# Patient Record
Sex: FEMALE | Race: BLACK OR AFRICAN AMERICAN | NOT HISPANIC OR LATINO | Employment: OTHER | URBAN - METROPOLITAN AREA
[De-identification: names, ages, dates, MRNs, and addresses within clinical notes are randomized per-mention and may not be internally consistent; named-entity substitution may affect disease eponyms.]

---

## 2017-10-10 ENCOUNTER — HISTORICAL (OUTPATIENT)
Dept: INTERNAL MEDICINE | Facility: CLINIC | Age: 72
End: 2017-10-10

## 2018-08-23 ENCOUNTER — HISTORICAL (OUTPATIENT)
Dept: RADIOLOGY | Facility: HOSPITAL | Age: 73
End: 2018-08-23

## 2018-08-31 ENCOUNTER — HISTORICAL (OUTPATIENT)
Dept: INTERNAL MEDICINE | Facility: CLINIC | Age: 73
End: 2018-08-31

## 2018-10-19 ENCOUNTER — HISTORICAL (OUTPATIENT)
Dept: INTERNAL MEDICINE | Facility: CLINIC | Age: 73
End: 2018-10-19

## 2019-02-01 ENCOUNTER — HISTORICAL (OUTPATIENT)
Dept: INTERNAL MEDICINE | Facility: CLINIC | Age: 74
End: 2019-02-01

## 2019-07-05 ENCOUNTER — HISTORICAL (OUTPATIENT)
Dept: INTERNAL MEDICINE | Facility: CLINIC | Age: 74
End: 2019-07-05

## 2019-11-14 ENCOUNTER — HISTORICAL (OUTPATIENT)
Dept: RADIOLOGY | Facility: HOSPITAL | Age: 74
End: 2019-11-14

## 2020-08-06 ENCOUNTER — HISTORICAL (OUTPATIENT)
Dept: INTERNAL MEDICINE | Facility: CLINIC | Age: 75
End: 2020-08-06

## 2020-08-06 ENCOUNTER — HISTORICAL (OUTPATIENT)
Dept: ADMINISTRATIVE | Facility: HOSPITAL | Age: 75
End: 2020-08-06

## 2021-06-24 ENCOUNTER — HISTORICAL (OUTPATIENT)
Dept: INTERNAL MEDICINE | Facility: CLINIC | Age: 76
End: 2021-06-24

## 2021-06-26 LAB — FINAL CULTURE: NORMAL

## 2021-07-12 ENCOUNTER — HISTORICAL (OUTPATIENT)
Dept: RADIOLOGY | Facility: HOSPITAL | Age: 76
End: 2021-07-12

## 2021-08-24 ENCOUNTER — HISTORICAL (OUTPATIENT)
Dept: INTERNAL MEDICINE | Facility: CLINIC | Age: 76
End: 2021-08-24

## 2021-10-19 ENCOUNTER — HISTORICAL (OUTPATIENT)
Dept: ADMINISTRATIVE | Facility: HOSPITAL | Age: 76
End: 2021-10-19

## 2021-10-21 LAB — FINAL CULTURE: NO GROWTH

## 2021-10-28 ENCOUNTER — HISTORICAL (OUTPATIENT)
Dept: RADIOLOGY | Facility: HOSPITAL | Age: 76
End: 2021-10-28

## 2021-11-15 ENCOUNTER — HISTORICAL (OUTPATIENT)
Dept: CARDIOLOGY | Facility: HOSPITAL | Age: 76
End: 2021-11-15

## 2022-03-18 ENCOUNTER — HISTORICAL (OUTPATIENT)
Dept: INTERNAL MEDICINE | Facility: CLINIC | Age: 77
End: 2022-03-18

## 2022-03-18 LAB
ABS NEUT (OLG): 5.14 (ref 2.1–9.2)
ALBUMIN SERPL-MCNC: 3.6 G/DL (ref 3.4–4.8)
ALBUMIN/GLOB SERPL: 1.1 {RATIO} (ref 1.1–2)
ALP SERPL-CCNC: 31 U/L (ref 40–150)
ALT SERPL-CCNC: 12 U/L (ref 0–55)
AST SERPL-CCNC: 13 U/L (ref 5–34)
BASOPHILS # BLD AUTO: 0 10*3/UL (ref 0–0.2)
BASOPHILS NFR BLD AUTO: 0 %
BILIRUB SERPL-MCNC: 1 MG/DL
BILIRUBIN DIRECT+TOT PNL SERPL-MCNC: 0.4 (ref 0–0.5)
BILIRUBIN DIRECT+TOT PNL SERPL-MCNC: 0.6 (ref 0–0.8)
BUN SERPL-MCNC: 12.5 MG/DL (ref 9.8–20.1)
CALCIUM SERPL-MCNC: 9 MG/DL (ref 8.7–10.5)
CHLORIDE SERPL-SCNC: 107 MMOL/L (ref 98–107)
CHOLEST SERPL-MCNC: 120 MG/DL
CHOLEST/HDLC SERPL: 2 {RATIO} (ref 0–5)
CO2 SERPL-SCNC: 25 MMOL/L (ref 23–31)
CREAT SERPL-MCNC: 0.81 MG/DL (ref 0.55–1.02)
DEPRECATED CALCIDIOL+CALCIFEROL SERPL-MC: 39.9 NG/ML (ref 30–80)
EOSINOPHIL # BLD AUTO: 0 10*3/UL (ref 0–0.9)
EOSINOPHIL NFR BLD AUTO: 0 %
ERYTHROCYTE [DISTWIDTH] IN BLOOD BY AUTOMATED COUNT: 14 % (ref 11.5–14.5)
FLAG2 (OHS): 60
FLAG3 (OHS): 80
FLAGS (OHS): 80
GLOBULIN SER-MCNC: 3.3 G/DL (ref 2.4–3.5)
GLUCOSE SERPL-MCNC: 108 MG/DL (ref 82–115)
HCT VFR BLD AUTO: 36.3 % (ref 35–46)
HDLC SERPL-MCNC: 48 MG/DL (ref 35–60)
HEMOLYSIS INTERF INDEX SERPL-ACNC: 1
HGB BLD-MCNC: 11.3 G/DL (ref 12–16)
ICTERIC INTERF INDEX SERPL-ACNC: 1
IMM GRANULOCYTES # BLD AUTO: 0.27 10*3/UL
IMM GRANULOCYTES NFR BLD AUTO: 3 %
IMM. NE 1 SUSPECT FLAG (OHS): 20
IMM. NE 2 SUSPECT FLAG (OHS): 30
LDLC SERPL CALC-MCNC: 58 MG/DL (ref 50–140)
LIPEMIC INTERF INDEX SERPL-ACNC: 3
LOW EVENT # SUSPECT FLAG (OHS): 80
LYMPHOCYTES # BLD AUTO: 2.1 10*3/UL (ref 0.6–4.6)
LYMPHOCYTES NFR BLD AUTO: 25 %
MANUAL DIFF? (OHS): NO
MCH RBC QN AUTO: 27.8 PG (ref 26–34)
MCHC RBC AUTO-ENTMCNC: 31.1 G/DL (ref 31–37)
MCV RBC AUTO: 89.2 FL (ref 80–100)
MO BLASTS SUSPECT FLAG (OHS): 40
MONOCYTES # BLD AUTO: 1 10*3/UL (ref 0.1–1.3)
MONOCYTES NFR BLD AUTO: 11 %
NEUTROPHILS # BLD AUTO: 5.14 10*3/UL (ref 2.1–9.2)
NEUTROPHILS NFR BLD AUTO: 60 %
NRBC BLD AUTO-RTO: 0 % (ref 0–0.2)
PLATELET # BLD AUTO: 357 10*3/UL (ref 130–400)
PLATELET CLUMPS SUSPECT FLAG (OHS): 10
PMV BLD AUTO: 8.6 FL (ref 7.4–10.4)
POTASSIUM SERPL-SCNC: 3.9 MMOL/L (ref 3.5–5.1)
PROT SERPL-MCNC: 6.9 G/DL (ref 5.8–7.6)
RBC # BLD AUTO: 4.07 10*6/UL (ref 4–5.2)
SODIUM SERPL-SCNC: 139 MMOL/L (ref 136–145)
T4 SERPL-MCNC: 7.84 UG/DL (ref 4.87–11.72)
TRIGL SERPL-MCNC: 69 MG/DL (ref 37–140)
TSH SERPL-ACNC: 2.22 M[IU]/L (ref 0.35–4.94)
VLDLC SERPL CALC-MCNC: 14 MG/DL
WBC # SPEC AUTO: 8.6 10*3/UL (ref 4.5–11)

## 2022-04-07 ENCOUNTER — HISTORICAL (OUTPATIENT)
Dept: ADMINISTRATIVE | Facility: HOSPITAL | Age: 77
End: 2022-04-07
Payer: MEDICAID

## 2022-04-24 VITALS
HEIGHT: 65 IN | BODY MASS INDEX: 17.7 KG/M2 | DIASTOLIC BLOOD PRESSURE: 63 MMHG | SYSTOLIC BLOOD PRESSURE: 120 MMHG | WEIGHT: 106.25 LBS

## 2022-05-02 NOTE — HISTORICAL [facility]ERNER
This is a historical note converted from Cerjim. Formatting and pictures may have been removed.  Please reference Cerjim for original formatting and attached multimedia. Chief Complaint  Follow up ,lab results  History of Present Illness  76 year old AAF, presents in?clinic for f/u labs.?Daughter Shakira present today. Pt was started on Megace 2021 for poor appetite with steady weight loss. Shakira states pt is now eating 3 meals per day and drinking 2-3 Boost/Glucerna daily. 3 lb weight gain noted since 2021. Weight today is 106lbs, BMI 17.78.  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 Pt lives with daughter SRIRAM Iglesias. Pt has another daughter, who works full time, but grown great grandchildren, and pts brother?rotate, take turns, staying with elderly pt during the day. Shakira gets off work after 3 pm every day and is able to care for pt during the evenings/nights.  nurse,?Rodriguez?coming in the home 2x per week since last OV. Pt is able to self shower, use restroom, and feed herself. She is not able to prepare meals, dress her self, drive, grocery shop, or pay bills. Previously on?Namenda 5 bid, but due to insurance this was switched to donepezil-memantine once daily. Daughter states mentation changes, more confusion in the past?12 months. Denies pt being a physical threat to others or herself.?Pt is not oriented to time, but oriented to person and place. Daughter states?pt sometimes calls on her?  and son, questions if she has children.?On Trazodone?150mg at bedtime, sleeping well.? Wakes one time at night to use restroom but returns to bed. States she is taking?(2) naps per day each lasting ~2hrs. Denies any recent falls.?Pt is using rollator to ambulate. Denies any pain at present but complains of chronic and intermittent left knee swelling, atraumatic. Previously following [facility]  for luigi knee OA.?Takes Tylenol 1 tab prn pain.  Denies chest pain, shortness of breath,?cough, fever, headache,?dizziness, weakness, abdominal pain, nausea,?vomiting, diarrhea, constipation, dysuria, depression, anxiety.  ?   Colonoscopy per Dr. Way 7/2015;Three small 4 mm polyps scattered throughout the colon. Referred to [facility]; and Dr. Snow ; apt pending. Daughter given phone number to call to schedule.  FIT neg 7/2019; reordered and never collected.  MMG WNL 8/2018; no showed 12/7/2020 and needs to reschedule (reordered today)  DEXA 2019; Osteoporosis noted Lumbar spine and femoral neck; no showed 12/7/2020 and needs to reschedule (reordered today)  CT head 4/21/2020 -??Age-related atrophy and chronic sequela of white matter  microvascular disease.  MRI brain 7/2021 No acute intracranial abnormality. Mild chronic microvascular ischemic changes and parenchymal volume  loss.  Review of Systems  Constitutional: negative except as stated in HPI  Eye: negative except as stated in HPI  ENMT: negative except as stated in HPI  Respiratory: negative except as stated in HPI  Cardiovascular: negative except as stated in HPI  Gastrointestinal: negative except as stated in HPI  Genitourinary: negative except as stated in HPI  Hema/Lymph: negative except as stated in HPI  Endocrine: negative except as stated in HPI  Immunologic: negative except as stated in HPI  Musculoskeletal: negative except as stated in HPI  Integumentary: negative except as stated in HPI  Neurologic: negative except as stated in HPI  ?   All Other ROS_ ?negative except as stated in HPI  Physical Exam  Vitals & Measurements  T:?36.9? ?C (Oral)? HR:?68(Peripheral)? RR:?16? BP:?122/64?  HT:?165.00?cm? WT:?48.400?kg? BMI:?17.78?  General: Alert and oriented, No acute distress.  Eye: Pupils are equal, round and reactive to light, Extraocular movements are intact, Normal conjunctiva.  HENT: Normocephalic, Normal hearing, Oral mucosa is moist, No  pharyngeal erythema.  Neck: Supple, Non-tender, No lymphadenopathy, No thyromegaly.  Respiratory: Lungs are clear to auscultation, Respirations are non-labored, Breath sounds are equal, Symmetrical chest wall expansion.  Cardiovascular: Normal rate, Regular rhythm, No murmur, Normal peripheral perfusion, No edema.  Gastrointestinal: Soft, Non-tender, Non-distended, Normal bowel sounds, No organomegaly.  Genitourinary: No costovertebral angle tenderness, No inguinal tenderness.  Lymphatics: No lymphadenopathy neck, axilla, groin.  Musculoskeletal: Normal range of motion, Normal strength, No tenderness, Normal gait. +?left knee swelling  Neurologic: No focal deficits, Cranial Nerves II-XII are grossly intact.  Integumentary: Warm, Dry, Intact.  Feet: no edema 2+ pedal pulses bilaterally.  ?  ?  Reason For Exam  f03.90 ?Dementia, Alzheimers suspected;Other (please specify)  ?  Radiology Report  EXAM: MRI Brain W/O Contrast  ?  HISTORY: Dementia, Alzheimers suspected  ?  COMPARISON STUDIES: CT head dated 4/20/2020  ?  TECHNIQUE:?  Multiplanar, multisequence MR images of the brain were obtained  without the administration of intravenous contrast.  ?  DISCUSSION:  ?  Evaluation is limited by motion artifact. There is no restricted  diffusion, hemorrhage or edema. There are mild scattered and patchy  T2/flair hyperintensities in the subcortical and periventricular white  matter, nonspecific but likely representing chronic microvascular  ischemic changes.  ?  There is mild diffuse parenchymal volume loss. There is no  hydrocephalus or abnormal extra-axial fluid collection. The major  intracranial flow voids are patent. There is mild scattered paranasal  sinus mucosal thickening. The mastoid air cells are clear.  ?  ?  IMPRESSION:?  1. ?No acute intracranial abnormality.  2. ?Mild chronic microvascular ischemic changes and parenchymal volume  loss.  ?  Signature Line  Electronically Signed By: Mimi Puentes MD  Date/Time  Signed: 07/13/2021 09:39  ?  Assessment/Plan  BMI less than 19,adult?Z68.1  ?Increase caloric intake. Megace continued.  Ordered:  1160F- Medication reconciliation completed during visit, Hypertension  Hypothyroidism  Weight increasing  Cognitive decline  Prediabetes  Pain and swelling of left knee  Screening for HIV (human immunodeficiency virus)  Screen for STD (sexually transmitted disease)  BMI less than 19,adult, [facility] Inte...  Office/Outpatient Visit Level 4 Established [id number] PC, Hypertension  Hypothyroidism  Weight increasing  Cognitive decline  Prediabetes  Pain and swelling of left knee  Screening for HIV (human immunodeficiency virus)  Screen for STD (sexually transmitted disease)  BMI less than 19,adult, [facility] Inte...  ?  Cognitive decline?R41.89  ?NEURO apt scheduled on 12/23/2021.  Ordered:  1160F- Medication reconciliation completed during visit, Hypertension  Hypothyroidism  Weight increasing  Cognitive decline  Prediabetes  Pain and swelling of left knee  Screening for HIV (human immunodeficiency virus)  Screen for STD (sexually transmitted disease)  BMI less than 19,adult, [facility] Inte...  Clinic Follow up, *Est. 01/19/22 3:00:00 CST, Order for future visit, Hypertension  Hypothyroidism  Weight increasing  Cognitive decline  Prediabetes, [facility] Internal Med Service  Office/Outpatient Visit Level 4 Established [id number] PC, Hypertension  Hypothyroidism  Weight increasing  Cognitive decline  Prediabetes  Pain and swelling of left knee  Screening for HIV (human immunodeficiency virus)  Screen for STD (sexually transmitted disease)  BMI less than 19,adult, [facility] Inte...  ?  Hypertension?I10  BP stable. Med refills.?Home BP monitoring encouraged with BP parameters given. DASH diet: Eat more fruits, vegetables, and low fat dairy foods. Low sodium diet.  Ordered:  1160F- Medication reconciliation completed during visit, Hypertension  Hypothyroidism  Weight increasing  Cognitive  decline  Prediabetes  Pain and swelling of left knee  Screening for HIV (human immunodeficiency virus)  Screen for STD (sexually transmitted disease)  BMI less than 19,adult, [facility] Inte...  CBC w/ Auto Diff, Routine collect, *Est. 01/19/22 3:00:00 CST, Blood, Order for future visit, *Est. Stop date 01/19/22 3:00:00 CST, Lab Collect, Hypertension  Prediabetes, 10/19/21 9:47:00 CDT  Clinic Follow up, *Est. 01/19/22 3:00:00 CST, Order for future visit, Hypertension  Hypothyroidism  Weight increasing  Cognitive decline  Prediabetes, [facility] Internal Med Service  Comprehensive Metabolic Panel, Routine collect, *Est. 01/19/22 3:00:00 CST, Blood, Order for future visit, *Est. Stop date 01/19/22 3:00:00 CST, Lab Collect, Hypertension  Prediabetes, 10/19/21 9:47:00 CDT  Hemoglobin A1C [facility], Routine collect, *Est. 01/19/22 3:00:00 CST, Blood, Order for future visit, *Est. Stop date 01/19/22 3:00:00 CST, Lab Collect, Hypertension  Prediabetes, 10/19/21 9:47:00 CDT  Lipid Panel, Routine collect, *Est. 01/19/22 3:00:00 CST, Blood, Order for future visit, *Est. Stop date 01/19/22 3:00:00 CST, Lab Collect, Hypertension  Prediabetes, 10/19/21 9:47:00 CDT  Office/Outpatient Visit Level 4 Established [id number] PC, Hypertension  Hypothyroidism  Weight increasing  Cognitive decline  Prediabetes  Pain and swelling of left knee  Screening for HIV (human immunodeficiency virus)  Screen for STD (sexually transmitted disease)  BMI less than 19,adult, [facility] Inte...  ?  Hypothyroidism?E03.9  ?TSH 2.5 at goal- labs per  nurse- scanned to chart. Levothyroxine 112mcg daily continued.  Ordered:  1160F- Medication reconciliation completed during visit, Hypertension  Hypothyroidism  Weight increasing  Cognitive decline  Prediabetes  Pain and swelling of left knee  Screening for HIV (human immunodeficiency virus)  Screen for STD (sexually transmitted disease)  BMI less than 19,adult, [facility] Inte...  Clinic Follow up, *Est.  01/19/22 3:00:00 CST, Order for future visit, Hypertension  Hypothyroidism  Weight increasing  Cognitive decline  Prediabetes, [facility] Internal Med Service  Office/Outpatient Visit Level 4 Established [id number] PC, Hypertension  Hypothyroidism  Weight increasing  Cognitive decline  Prediabetes  Pain and swelling of left knee  Screening for HIV (human immunodeficiency virus)  Screen for STD (sexually transmitted disease)  BMI less than 19,adult, [facility] Inte...  T4, Routine collect, *Est. 01/19/22 3:00:00 CST, Blood, Order for future visit, *Est. Stop date 01/19/22 3:00:00 CST, Lab Collect, Hypothyroidism, 10/19/21 9:47:00 CDT  Thyroid Stimulating Hormone, Routine collect, *Est. 01/19/22 3:00:00 CST, Blood, Order for future visit, *Est. Stop date 01/19/22 3:00:00 CST, Lab Collect, Hypothyroidism, 10/19/21 9:47:00 CDT  ?  Need for influenza vaccination?Z23  ?Administered high dose today  Ordered:  Influenza Virus Vaccine, Inactivated, 0.7 mL, form: Susp, IM, Once-Unscheduled, first dose 10/19/21 9:41:00 CDT  ?  Pain and swelling of left knee?M25.562  XR knee today  Will refer to ORTHO for possible CSI  RX diclofenac gel  Ordered:  1160F- Medication reconciliation completed during visit, Hypertension  Hypothyroidism  Weight increasing  Cognitive decline  Prediabetes  Pain and swelling of left knee  Screening for HIV (human immunodeficiency virus)  Screen for STD (sexually transmitted disease)  BMI less than 19,adult, [facility] Inte...  Office/Outpatient Visit Level 4 Established [id number] PC, Hypertension  Hypothyroidism  Weight increasing  Cognitive decline  Prediabetes  Pain and swelling of left knee  Screening for HIV (human immunodeficiency virus)  Screen for STD (sexually transmitted disease)  BMI less than 19,adult, [facility] Inte...  XR Knee Left 4 or More Views, Routine, *Est. 10/19/21 3:00:00 CDT, None, Ambulatory, Rad Type, Order for future visit, Pain and swelling of left knee, Not Scheduled, *Est.  10/19/21 3:00:00 CDT  ?  Prediabetes?R73.03  ?A1C?pending. Avoid high fructose corn syrup, sugary drinks, sodas, sweets, candy, etc.  Ordered:  Misc Prescription, Glucometer, See Instructions, use to monitor glucose once a day R73.03, # 1 EA, 0 Refill(s), Pharmacy: [place] [id number], 165, cm, Height/Length Dosing, 10/19/21 8:59:00 CDT, 48.4, kg, Weight Dosing, 10/19/21 8:59:00 CDT  1160F- Medication reconciliation completed during visit, Hypertension  Hypothyroidism  Weight increasing  Cognitive decline  Prediabetes  Pain and swelling of left knee  Screening for HIV (human immunodeficiency virus)  Screen for STD (sexually transmitted disease)  BMI less than 19,adult, [facility] Inte...  CBC w/ Auto Diff, Routine collect, *Est. 01/19/22 3:00:00 CST, Blood, Order for future visit, *Est. Stop date 01/19/22 3:00:00 CST, Lab Collect, Hypertension  Prediabetes, 10/19/21 9:47:00 CDT  Clinic Follow up, *Est. 01/19/22 3:00:00 CST, Order for future visit, Hypertension  Hypothyroidism  Weight increasing  Cognitive decline  Prediabetes, [facility] Internal Med Service  Comprehensive Metabolic Panel, Routine collect, *Est. 10/19/21 3:00:00 CDT, Blood, Order for future visit, *Est. Stop date 10/19/21 3:00:00 CDT, Lab Collect, Prediabetes, 10/19/21 9:28:00 CDT  Comprehensive Metabolic Panel, Routine collect, *Est. 01/19/22 3:00:00 CST, Blood, Order for future visit, *Est. Stop date 01/19/22 3:00:00 CST, Lab Collect, Hypertension  Prediabetes, 10/19/21 9:47:00 CDT  Hemoglobin A1C [facility], Routine collect, *Est. 01/19/22 3:00:00 CST, Blood, Order for future visit, *Est. Stop date 01/19/22 3:00:00 CST, Lab Collect, Hypertension  Prediabetes, 10/19/21 9:47:00 CDT  Hemoglobin A1C [facility], Routine collect, *Est. 10/19/21 3:00:00 CDT, Blood, Order for future visit, *Est. Stop date 10/19/21 3:00:00 CDT, Lab Collect, Prediabetes, 10/19/21 9:28:00 CDT  Lipid Panel, Routine collect, *Est. 01/19/22 3:00:00 CST, Blood, Order for future visit,  *Est. Stop date 01/19/22 3:00:00 CST, Lab Collect, Hypertension  Prediabetes, 10/19/21 9:47:00 CDT  Office/Outpatient Visit Level 4 Established [id number] PC, Hypertension  Hypothyroidism  Weight increasing  Cognitive decline  Prediabetes  Pain and swelling of left knee  Screening for HIV (human immunodeficiency virus)  Screen for STD (sexually transmitted disease)  BMI less than 19,adult, [facility] Inte...  Urinalysis with Microscopic if Indicated, Routine collect, Urine, Order for future visit, *Est. 10/19/21 3:00:00 CDT, *Est. Stop date 10/19/21 3:00:00 CDT, Nurse collect, Prediabetes  ?  Screen for STD (sexually transmitted disease)?Z11.3  Ordered:  1160F- Medication reconciliation completed during visit, Hypertension  Hypothyroidism  Weight increasing  Cognitive decline  Prediabetes  Pain and swelling of left knee  Screening for HIV (human immunodeficiency virus)  Screen for STD (sexually transmitted disease)  BMI less than 19,adult, [facility] Inte...  Hepatitis Panel-, Routine collect, *Est. 10/19/21 3:00:00 CDT, Blood, Order for future visit, *Est. Stop date 10/19/21 3:00:00 CDT, Lab Collect, Screen for STD (sexually transmitted disease), 10/19/21 9:29:00 CDT  Office/Outpatient Visit Level 4 Established [id number] PC, Hypertension  Hypothyroidism  Weight increasing  Cognitive decline  Prediabetes  Pain and swelling of left knee  Screening for HIV (human immunodeficiency virus)  Screen for STD (sexually transmitted disease)  BMI less than 19,adult, [facility] Inte...  Syphilis Antibody (with Reflex RPR), Routine collect, *Est. 10/19/21 3:00:00 CDT, Blood, Order for future visit, *Est. Stop date 10/19/21 3:00:00 CDT, Lab Collect, Screen for STD (sexually transmitted disease), 10/19/21 9:29:00 CDT  ?  Screening for HIV (human immunodeficiency virus)?Z11.4  Ordered:  1160F- Medication reconciliation completed during visit, Hypertension  Hypothyroidism  Weight increasing  Cognitive decline  Prediabetes  Pain  and swelling of left knee  Screening for HIV (human immunodeficiency virus)  Screen for STD (sexually transmitted disease)  BMI less than 19,adult, [facility] Inte...  HIV 1 and 2, Routine collect, *Est. 10/19/21 3:00:00 CDT, Blood, Order for future visit, *Est. Stop date 10/19/21 3:00:00 CDT, Lab Collect, Screening for HIV (human immunodeficiency virus), 10/19/21 9:29:00 CDT  Office/Outpatient Visit Level 4 Established [id number] PC, Hypertension  Hypothyroidism  Weight increasing  Cognitive decline  Prediabetes  Pain and swelling of left knee  Screening for HIV (human immunodeficiency virus)  Screen for STD (sexually transmitted disease)  BMI less than 19,adult, [facility] Inte...  ?  Weight increasing?R63.5  Megace continued  Ensure 2-3x daily  Continue increase caloric / protein intake, avoiding processed foods and sweets  Ordered:  1160F- Medication reconciliation completed during visit, Hypertension  Hypothyroidism  Weight increasing  Cognitive decline  Prediabetes  Pain and swelling of left knee  Screening for HIV (human immunodeficiency virus)  Screen for STD (sexually transmitted disease)  BMI less than 19,adult, [facility] Inte...  Clinic Follow up, *Est. 01/19/22 3:00:00 CST, Order for future visit, Hypertension  Hypothyroidism  Weight increasing  Cognitive decline  Prediabetes, [facility] Internal Med Service  Office/Outpatient Visit Level 4 Established [id number] PC, Hypertension  Hypothyroidism  Weight increasing  Cognitive decline  Prediabetes  Pain and swelling of left knee  Screening for HIV (human immunodeficiency virus)  Screen for STD (sexually transmitted disease)  BMI less than 19,adult, [facility] Inte...  ?  Orders:  alendronate, 70 mg = 1 tab(s), Oral, qWeek, with 6-8 oz plain water, at least 30 minutes before first food, beverage, or medication of the day, # 12 tab(s), 1 Refill(s), Pharmacy: [place] [id number] [id number], 165, cm, Height/Length Dosing, 10/19/21 8:59:00 CDT, 48.4, kg...  apixaban, 5 mg = 1  tab(s), Oral, BID, # 180 tab(s), 1 Refill(s), Pharmacy: [place] [facility] [id number] [facility], 165, cm, Height/Length Dosing, 10/19/21 8:59:00 CDT, 48.4, kg, Weight Dosing, 10/19/21 8:59:00 CDT  atorvastatin, 20 mg = 1 tab(s), Oral, At Bedtime, # 90 tab(s), 1 Refill(s), Pharmacy: [place] [facility] [address] [facility], 165, cm, Height/Length Dosing, 10/19/21 8:59:00 CDT, 48.4, kg, Weight Dosing, 10/19/21 8:59:00 CDT  diclofenac topical, 4 gm =, TOP, QID, PRN PRN as needed for left knee pain, # 100 gm, 1 Refill(s), Pharmacy: [place] [facility] [id number] [facility], 165, cm, Height/Length Dosing, 10/19/21 8:59:00 CDT, 48.4, kg, Weight Dosing, 10/19/21 8:59:00 CDT  donepezil-memantine, 1 cap(s), Oral, Daily, # 30 cap(s), 6 Refill(s), Pharmacy: [place] [facility] [address] [facility], 165, cm, Height/Length Dosing, 10/19/21 8:59:00 CDT, 48.4, kg, Weight Dosing, 10/19/21 8:59:00 CDT  levothyroxine, 112 mcg = 1 tab(s), Oral, Daily, # 90 tab(s), 1 Refill(s), Pharmacy: [place] [address] [facility], 165, cm, Height/Length Dosing, 10/19/21 8:59:00 CDT, 48.4, kg, Weight Dosing, 10/19/21 8:59:00 CDT  losartan, See Instructions, TAKE ONE TABLET BY MOUTH EVERY DAY, # 90 tab(s), 1 Refill(s), Pharmacy: [place] [id number] [facility], 165, cm, Height/Length Dosing, 10/19/21 8:59:00 CDT, 48.4, kg, Weight Dosing, 10/19/21 8:59:00 CDT  [facility] Prescription, glucometer test strips and lancets, See Instructions, use to monitor glucose once a day R73.03, # 50 EA, 6 Refill(s), Pharmacy: [place] [address] [facility], 165, cm, Height/Length Dosing, 10/19/21 8:59:00 CDT, 48.4, kg, Weight Dosing, 10/19/21 8:59:00 CDT  traZODone, 150 mg = 1 tab(s), Oral, Once a day (at bedtime), # 90 tab(s), 1 Refill(s), Pharmacy: [place] [address] [facility], 165, cm, Height/Length Dosing, 10/19/21 8:59:00 CDT, 48.4, kg, Weight Dosing, 10/19/21 8:59:00 CDT  Vitamin D, 25-Hydroxy Level, Routine collect, *Est. 01/19/22 3:00:00 CST, Blood, Order for future visit, *Est. Stop date 01/19/22 3:00:00 CST, Lab Collect, Vitamin D deficiency, 10/19/21 9:47:00  CDT  Referrals  Clinic Follow up, *Est. 01/28/22 8:20:00 CST, Order for future visit, Prediabetes, [facility] Internal Med Service  Needs to schedule with GI Dr. Snow, daughter given phone number to GI  MMG and DEXA (no showed), I have reordered.  ?  ?   Labs thoroughly reviewed with patient. Medication refills addressed today.  RTC prn and 3-months, with labs 1 week prior to the apt.  COVID 19 precautions given to patient.  Patient voices understanding of all discharge instructions.?   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  Decreased appetite  Dementia  Diabetes  Diabetes  DVT (deep venous thrombosis)  EDEMA  Edema of leg  Forgetfulness  Gait instability  HLD (hyperlipidemia)  HTN (hypertension)  Hypertension  Hyperthyroidism  Hyperthyroidism  Hypothyroidism  Insomnia  Memory loss  Multinodular goiter  Osteoarthritis of both knees  Post-menopause  Thyroid nodule  Visit for screening mammogram  Well adult  Well adult  Well adult exam  Historical  No qualifying data  Procedure/Surgical History  Colonoscopy, flexible; with biopsy, single or multiple (07/07/2015)  Endoscopic polypectomy of large intestine (07/07/2015)  Complete thyroidectomy (04/02/2015)  Parathyroid tissue reimplantation (04/02/2015)  Thyroidectomy (.) (04/02/2015)  Hysterectomy  Inferior vena cava filter in situ   Medications  Bedside commode, See Instructions  calcium (as carbonate)-vitamin D 600 mg-800 intl units oral tablet, 1 tab(s), Oral, Daily  donepezil-memantine 10 mg-14 mg oral capsule, extended release, 1 cap(s), Oral, Daily, 6 refills  Eliquis 5 mg oral tablet, 5 mg= 1 tab(s), Oral, BID, 1 refills,? ?Still taking, not as prescribed  Fosamax 70 mg oral tablet, 70 mg= 1 tab(s), Oral, qWeek, 1 refills  levothyroxine 112 mcg (0.112 mg) oral tablet, 112 mcg= 1 tab(s), Oral, Daily, 1 refills  Lipitor 20 mg oral tablet, 20 mg= 1 tab(s), Oral, At Bedtime, 1 refills  losartan 25 mg oral tablet, See  Instructions, 1 refills  megestrol 40 mg/mL oral suspension, 800 mg= 20 mL, Oral, Daily, 6 refills  traZODONE 150 mg oral tab ( Desyrel ), 150 mg= 1 tab(s), Oral, Once a day (at bedtime), 1 refills  Tylenol 8 HR Arthritis Pain, 1300 mg, Oral, q8hr  Allergies  Ultracet  aspirin?(stomach pain)  contrast media (iodine-based)  Social History  Abuse/Neglect  No, 10/19/2021  Alcohol  Past, 10/25/2018  Exercise  Self assessment: Poor condition., 07/28/2015  Exercise type: does not excercise., 05/13/2015  Home/Environment  Lives with Lives with daughter. Wheelchair, 08/24/2021  Nutrition/Health  Regular, Sleeping concerns: No. Feels highly stressed: No., 10/25/2018  Caffeine intake amount: coffee 2 cups every morning. Wants to lose weight: No. Sleeping concerns: Yes. Feels highly stressed: Yes., 07/28/2015  Other  Sexual  Substance Use  Never, 05/20/2015  Tobacco  Never (less than 100 in lifetime), N/A, 10/19/2021  Family History  Hypertension.: Mother.  Stomach cancer.: Brother.  Thyroid disease.: Mother and Brother.  Immunizations  Vaccine Date Status   influenza virus vaccine, inactivated 10/07/2019 Given   influenza virus vaccine, inactivated 10/25/2018 Given   pneumococcal 23-polyvalent vaccine 07/10/2018 Given   influenza virus vaccine, inactivated 10/20/2014 Recorded   Health Maintenance  Health Maintenance  ???Pending?(in the next year)  ??? ??OverDue  ??? ? ? ?Smoking Cessation due??01/01/21??and every 1??year(s)  ??? ??Due?  ??? ? ? ?Diabetes Maintenance-Eye Exam due??10/19/21??Unknown Frequency  ??? ? ? ?Diabetes Maintenance-Foot Exam due??10/19/21??Unknown Frequency  ??? ? ? ?Medicare Annual Wellness Exam due??10/19/21??and every 1??year(s)  ??? ? ? ?Zoster Vaccine due??10/19/21??Unknown Frequency  ??? ??Refused?  ??? ? ? ?Tetanus Vaccine due??10/19/21??and every 10??year(s)  ??? ??Due In Future?  ??? ? ? ?Bone Density Screening not due until??11/14/21??and every 2??year(s)  ??? ? ? ?Obesity Screening not due  until??01/01/22??and every 1??year(s)  ??? ? ? ?Advance Directive not due until??01/02/22??and every 1??year(s)  ??? ? ? ?Cognitive Screening not due until??01/02/22??and every 1??year(s)  ??? ? ? ?Fall Risk Assessment not due until??01/02/22??and every 1??year(s)  ??? ? ? ?Functional Assessment not due until??01/02/22??and every 1??year(s)  ??? ? ? ?Blood Pressure Screening not due until??06/28/22??and every 1??year(s)  ??? ? ? ?Body Mass Index Check not due until??06/28/22??and every 1??year(s)  ??? ? ? ?Hypertension Management-Blood Pressure not due until??06/28/22??and every 1??year(s)  ??? ? ? ?Depression Screening not due until??08/24/22??and every 1??year(s)  ??? ? ? ?Diabetes Maintenance-HgbA1c not due until??08/24/22??and every 1??year(s)  ??? ? ? ?Diabetes Screening not due until??08/24/22??and every 1??year(s)  ??? ? ? ?Hypertension Management-BMP not due until??08/24/22??and every 1??year(s)  ???Satisfied?(in the past 1 year)  ??? ??Satisfied?  ??? ? ? ?ADL Screening on??10/19/21.??Satisfied by Nicole Rosa LPN  ??? ? ? ?Advance Directive on??06/28/21.??Satisfied by Nicole Rosa LPN  ??? ? ? ?Blood Pressure Screening on??06/28/21.??Satisfied by Nicole Rosa LPN  ??? ? ? ?Body Mass Index Check on??06/28/21.??Satisfied by Nicole Rosa LPN  ??? ? ? ?Cognitive Screening on??06/28/21.??Satisfied by Nicole Rosa LPN  ??? ? ? ?Depression Screening on??08/24/21.??Satisfied by Nicole Rosa LPN  ??? ? ? ?Diabetes Maintenance-Fasting Lipid Profile on??08/24/21.??Satisfied by Judit Crawford  ??? ? ? ?Diabetes Maintenance-HgbA1c on??08/24/21.??Satisfied by Judit Crawford  ??? ? ? ?Diabetes Screening on??08/24/21.??Satisfied by Judit Crawford  ??? ? ? ?Fall Risk Assessment on??10/19/21.??Satisfied by Nicole Rosa LPN  ??? ? ? ?Functional Assessment on??10/19/21.??Satisfied by Nicole Rosa LPN  ??? ? ? ?Hypertension Management-BMP  on??08/24/21.??Satisfied by Judit Crawford  ??? ? ? ?Hypertension Management-Blood Pressure on??06/28/21.??Satisfied by Nicole Rosa LPN  ??? ? ? ?Lipid Screening on??08/24/21.??Satisfied by Judit Crawford  ??? ? ? ?Obesity Screening on??06/28/21.??Satisfied by Nicole Rosa LPN  ??? ??Refused?  ??? ? ? ?Tetanus Vaccine on??10/19/21.??Recorded by Nicole Rosa LPN??Reason: Patient Refuses  ??? ? ? ?Zoster Vaccine on??10/19/21.??Recorded by Nicole Rosa LPN??Reason: Patient Refuses  ?

## 2022-07-08 RX ORDER — MEMANTINE HYDROCHLORIDE AND DONEPEZIL HYDROCHLORIDE 7; 10 MG/1; MG/1
CAPSULE ORAL
Qty: 90 EACH | Refills: 0 | Status: SHIPPED | OUTPATIENT
Start: 2022-07-08 | End: 2022-08-26 | Stop reason: SDUPTHER

## 2022-07-08 RX ORDER — ATORVASTATIN CALCIUM 20 MG/1
TABLET, FILM COATED ORAL
Qty: 90 TABLET | Refills: 0 | Status: SHIPPED | OUTPATIENT
Start: 2022-07-08 | End: 2022-08-26 | Stop reason: SDUPTHER

## 2022-08-01 ENCOUNTER — DOCUMENT SCAN (OUTPATIENT)
Dept: HOME HEALTH SERVICES | Facility: HOSPITAL | Age: 77
End: 2022-08-01
Payer: MEDICARE

## 2022-08-26 ENCOUNTER — OFFICE VISIT (OUTPATIENT)
Dept: INTERNAL MEDICINE | Facility: CLINIC | Age: 77
End: 2022-08-26
Attending: NURSE PRACTITIONER
Payer: MEDICARE

## 2022-08-26 VITALS
BODY MASS INDEX: 18.98 KG/M2 | WEIGHT: 111.19 LBS | DIASTOLIC BLOOD PRESSURE: 66 MMHG | HEART RATE: 61 BPM | HEIGHT: 64 IN | SYSTOLIC BLOOD PRESSURE: 112 MMHG | TEMPERATURE: 98 F

## 2022-08-26 DIAGNOSIS — I10 HYPERTENSION, UNSPECIFIED TYPE: ICD-10-CM

## 2022-08-26 DIAGNOSIS — G30.1 SENILE DEMENTIA OF ALZHEIMER'S TYPE: ICD-10-CM

## 2022-08-26 DIAGNOSIS — E03.9 HYPOTHYROIDISM, UNSPECIFIED TYPE: ICD-10-CM

## 2022-08-26 DIAGNOSIS — E78.5 HYPERLIPIDEMIA, UNSPECIFIED HYPERLIPIDEMIA TYPE: ICD-10-CM

## 2022-08-26 DIAGNOSIS — F02.80 SENILE DEMENTIA OF ALZHEIMER'S TYPE: ICD-10-CM

## 2022-08-26 DIAGNOSIS — R73.03 PREDIABETES: ICD-10-CM

## 2022-08-26 DIAGNOSIS — E55.9 VITAMIN D DEFICIENCY: ICD-10-CM

## 2022-08-26 DIAGNOSIS — Z23 NEED FOR SHINGLES VACCINE: ICD-10-CM

## 2022-08-26 PROCEDURE — 3078F PR MOST RECENT DIASTOLIC BLOOD PRESSURE < 80 MM HG: ICD-10-PCS | Mod: CPTII,,, | Performed by: NURSE PRACTITIONER

## 2022-08-26 PROCEDURE — 1159F PR MEDICATION LIST DOCUMENTED IN MEDICAL RECORD: ICD-10-PCS | Mod: CPTII,,, | Performed by: NURSE PRACTITIONER

## 2022-08-26 PROCEDURE — 1101F PR PT FALLS ASSESS DOC 0-1 FALLS W/OUT INJ PAST YR: ICD-10-PCS | Mod: CPTII,,, | Performed by: NURSE PRACTITIONER

## 2022-08-26 PROCEDURE — 99213 OFFICE O/P EST LOW 20 MIN: CPT | Mod: PBBFAC | Performed by: NURSE PRACTITIONER

## 2022-08-26 PROCEDURE — 1126F PR PAIN SEVERITY QUANTIFIED, NO PAIN PRESENT: ICD-10-PCS | Mod: CPTII,,, | Performed by: NURSE PRACTITIONER

## 2022-08-26 PROCEDURE — 3074F SYST BP LT 130 MM HG: CPT | Mod: CPTII,,, | Performed by: NURSE PRACTITIONER

## 2022-08-26 PROCEDURE — 1159F MED LIST DOCD IN RCRD: CPT | Mod: CPTII,,, | Performed by: NURSE PRACTITIONER

## 2022-08-26 PROCEDURE — 1101F PT FALLS ASSESS-DOCD LE1/YR: CPT | Mod: CPTII,,, | Performed by: NURSE PRACTITIONER

## 2022-08-26 PROCEDURE — 3288F PR FALLS RISK ASSESSMENT DOCUMENTED: ICD-10-PCS | Mod: CPTII,,, | Performed by: NURSE PRACTITIONER

## 2022-08-26 PROCEDURE — 3288F FALL RISK ASSESSMENT DOCD: CPT | Mod: CPTII,,, | Performed by: NURSE PRACTITIONER

## 2022-08-26 PROCEDURE — 1160F PR REVIEW ALL MEDS BY PRESCRIBER/CLIN PHARMACIST DOCUMENTED: ICD-10-PCS | Mod: CPTII,,, | Performed by: NURSE PRACTITIONER

## 2022-08-26 PROCEDURE — 3078F DIAST BP <80 MM HG: CPT | Mod: CPTII,,, | Performed by: NURSE PRACTITIONER

## 2022-08-26 PROCEDURE — 1126F AMNT PAIN NOTED NONE PRSNT: CPT | Mod: CPTII,,, | Performed by: NURSE PRACTITIONER

## 2022-08-26 PROCEDURE — 1160F RVW MEDS BY RX/DR IN RCRD: CPT | Mod: CPTII,,, | Performed by: NURSE PRACTITIONER

## 2022-08-26 PROCEDURE — 99214 PR OFFICE/OUTPT VISIT, EST, LEVL IV, 30-39 MIN: ICD-10-PCS | Mod: S$PBB,,, | Performed by: NURSE PRACTITIONER

## 2022-08-26 PROCEDURE — 3074F PR MOST RECENT SYSTOLIC BLOOD PRESSURE < 130 MM HG: ICD-10-PCS | Mod: CPTII,,, | Performed by: NURSE PRACTITIONER

## 2022-08-26 PROCEDURE — 99214 OFFICE O/P EST MOD 30 MIN: CPT | Mod: S$PBB,,, | Performed by: NURSE PRACTITIONER

## 2022-08-26 RX ORDER — TRAZODONE HYDROCHLORIDE 150 MG/1
150 TABLET ORAL NIGHTLY
Qty: 90 TABLET | Refills: 0 | Status: SHIPPED | OUTPATIENT
Start: 2022-08-26 | End: 2022-11-03

## 2022-08-26 RX ORDER — GLUCOSAM/CHON-MSM1/C/MANG/BOSW 500-416.6
TABLET ORAL
COMMUNITY
Start: 2022-07-14 | End: 2023-01-06

## 2022-08-26 RX ORDER — MEGESTROL ACETATE 40 MG/ML
800 SUSPENSION ORAL DAILY
Qty: 600 ML | Refills: 2 | Status: SHIPPED | OUTPATIENT
Start: 2022-08-26 | End: 2024-01-10

## 2022-08-26 RX ORDER — DICLOFENAC SODIUM 10 MG/G
4 GEL TOPICAL
COMMUNITY
Start: 2021-12-20 | End: 2022-08-26 | Stop reason: SDUPTHER

## 2022-08-26 RX ORDER — LOSARTAN POTASSIUM 25 MG/1
TABLET ORAL
COMMUNITY
Start: 2021-12-20 | End: 2022-08-26 | Stop reason: SDUPTHER

## 2022-08-26 RX ORDER — ALENDRONATE SODIUM 70 MG/1
70 TABLET ORAL
Qty: 12 TABLET | Refills: 0 | Status: SHIPPED | OUTPATIENT
Start: 2022-08-26 | End: 2022-11-11 | Stop reason: SDUPTHER

## 2022-08-26 RX ORDER — LEVOTHYROXINE SODIUM 112 UG/1
112 TABLET ORAL
Qty: 90 TABLET | Refills: 0 | Status: SHIPPED | OUTPATIENT
Start: 2022-08-26 | End: 2022-11-11

## 2022-08-26 RX ORDER — MEMANTINE HYDROCHLORIDE AND DONEPEZIL HYDROCHLORIDE 7; 10 MG/1; MG/1
1 CAPSULE ORAL DAILY
Qty: 90 EACH | Refills: 0 | Status: SHIPPED | OUTPATIENT
Start: 2022-08-26 | End: 2022-11-15 | Stop reason: SDUPTHER

## 2022-08-26 RX ORDER — DICLOFENAC SODIUM 10 MG/G
4 GEL TOPICAL 4 TIMES DAILY PRN
Qty: 100 G | Refills: 1 | Status: SHIPPED | OUTPATIENT
Start: 2022-08-26 | End: 2022-11-11 | Stop reason: SDUPTHER

## 2022-08-26 RX ORDER — MEGESTROL ACETATE 40 MG/ML
800 SUSPENSION ORAL
COMMUNITY
Start: 2021-12-20 | End: 2022-08-26 | Stop reason: SDUPTHER

## 2022-08-26 RX ORDER — INSULIN PUMP SYRINGE, 3 ML
EACH MISCELLANEOUS
COMMUNITY
Start: 2021-10-19

## 2022-08-26 RX ORDER — LEVOTHYROXINE SODIUM 112 UG/1
112 TABLET ORAL
COMMUNITY
Start: 2021-12-20 | End: 2022-08-26 | Stop reason: SDUPTHER

## 2022-08-26 RX ORDER — TRAZODONE HYDROCHLORIDE 150 MG/1
150 TABLET ORAL
COMMUNITY
Start: 2022-03-21 | End: 2022-08-26 | Stop reason: SDUPTHER

## 2022-08-26 RX ORDER — LOSARTAN POTASSIUM 25 MG/1
25 TABLET ORAL DAILY
Qty: 90 TABLET | Refills: 0 | Status: SHIPPED | OUTPATIENT
Start: 2022-08-26 | End: 2022-11-11 | Stop reason: SDUPTHER

## 2022-08-26 RX ORDER — ATORVASTATIN CALCIUM 20 MG/1
20 TABLET, FILM COATED ORAL NIGHTLY
Qty: 90 TABLET | Refills: 0 | Status: SHIPPED | OUTPATIENT
Start: 2022-08-26 | End: 2022-11-11 | Stop reason: SDUPTHER

## 2022-08-26 NOTE — PROGRESS NOTES
Internal Medicine Clinic  [facility]     Patient Name: Esthela Marrufo   : 1945  MRN:[id number]     CC:  Chief Complaint   Patient presents with    Follow-up     Hx of Alzheimer's        HPI  77 year old AAF, presents in clinic with daughter Shakira for routine lab apt. No acute complaints voiced.   Referred to GERIATRIC clinic by [facility] NEURO clinic, apt 11/10/2022 to establish care. Request refills until seen by Geriatric clinic.    PMH recurrent DVT (has IVC filter and on Eliquis), HTN, HLD, prediabetes, knee OA, Alzheimer's, Hypothyroidism, H/O total thyroidectomy in 2015, vit d def, osteoporosis. Pt lives with daughter SRIRAM Iglesias. Pt has another daughter, who works full time, but grown great grandchildren, and pt's brother rotate, take turns, staying with elderly pt during the day. Shakira gets off work after 3 pm every day and is able to care for pt during the evenings/nights. Rell HERNANDEZ, discharged pt in 2022. Pt is able to self shower with shower chair, use restroom, and feed herself. She is not able to prepare meals, dress her self, drive, grocery shop, or pay bills. Previously on Namenda 5 bid, but due to insurance this was switched to donepezil-memantine once daily. Daughter states mentation changes, more confusion in the past 12 months. Denies pt being a physical threat to others or herself. Pt is not oriented to time, but oriented to person and place. Daughter states pt sometimes calls on her   and son, questions if she has children. On Trazodone 150mg at bedtime, sleeping well.  Wakes one time at night to use restroom but returns to bed. States she is taking (2) naps per day each lasting ~2hrs. Denies any recent falls. Pt is using rollator to ambulate. Denies any pain at present but complains of chronic and intermittent left knee swelling, atraumatic. Previously following [facility] for luigi knee OA. Takes Tylenol 1 tab prn pain. She is following Cardiology   Tony Franz. Denies any falls or other syncopal episodes since admit.   Denies chest pain, shortness of breath, cough, fever, headache, dizziness, weakness, abdominal pain, nausea, vomiting, diarrhea, constipation, dysuria, depression, anxiety.    Colonoscopy per Dr. Way 7/2015;Three small 4 mm polyps scattered throughout the colon. Referred to [facility]; and Dr. Snow ; apt pending. Daughter given phone number to call to schedule.  FIT neg 7/2019; reordered and never collected.  MMG 10/28/2021; BIRADS 2  DEXA 10/28/2021; Osteoporosis noted Lumbar spine and femoral neck  CT head 4/21/2020 -  Age-related atrophy and chronic sequela of white matter  microvascular disease.  MRI brain 7/2021 No acute intracranial abnormality. Mild chronic microvascular ischemic changes and parenchymal volume  Loss.  ECHO 12/13/2021; EF 67 %, mild to moderate mitral and tricuspid regurg, trace pericardial effusion.  Carotid US 12/12/2021, less than 50 percent stenosis bilaterally        ROS  Review of Systems   Constitutional: Negative.    HENT: Negative.    Eyes: Negative.    Respiratory: Negative.    Cardiovascular: Negative.    Gastrointestinal: Negative.    Endocrine: Negative.    Genitourinary: Negative.    Musculoskeletal: Negative.    Integumentary:  Negative.   Allergic/Immunologic: Negative.    Neurological: Negative.    Hematological: Negative.    Psychiatric/Behavioral: Negative.    All other systems reviewed and are negative.       Physical Examination:  Vitals:    08/26/22 0928   BP: 112/66   Pulse: 61   Temp: 98.2 °F (36.8 °C)          Physical Exam  Vitals and nursing note reviewed.   Constitutional:       Appearance: Normal appearance.   HENT:      Head: Normocephalic and atraumatic.      Right Ear: Tympanic membrane, ear canal and external ear normal.      Left Ear: Tympanic membrane, ear canal and external ear normal.      Nose: Nose normal.      Mouth/Throat:      Mouth: Mucous membranes are moist.      Pharynx:  Oropharynx is clear.   Eyes:      Extraocular Movements: Extraocular movements intact.      Conjunctiva/sclera: Conjunctivae normal.      Pupils: Pupils are equal, round, and reactive to light.   Cardiovascular:      Rate and Rhythm: Normal rate and regular rhythm.      Pulses: Normal pulses.      Heart sounds: Normal heart sounds.   Pulmonary:      Effort: Pulmonary effort is normal.      Breath sounds: Normal breath sounds.   Abdominal:      General: Abdomen is flat. Bowel sounds are normal.      Palpations: Abdomen is soft.   Musculoskeletal:         General: Normal range of motion.      Cervical back: Normal range of motion and neck supple.   Skin:     General: Skin is warm and dry.      Capillary Refill: Capillary refill takes less than 2 seconds.   Neurological:      General: No focal deficit present.      Mental Status: She is alert and oriented to person, place, and time. Mental status is at baseline.   Psychiatric:         Mood and Affect: Mood normal.         Behavior: Behavior normal.            Labs/Imaging:  Reviewed    Assessment/Plan:  1. Hypothyroidism, unspecified type  - T4; Future  - TSH; Future  Levothyroxine refilled  F/u 1 wk Virtual with family for results    2. Need for shingles vaccine  - varicella-zoster [id number] (PF)(SHINGRIX) 50 mcg/0.5 mL injection  2nd injection admin today    3. Hypertension, unspecified type  - CBC Auto Differential; Future  - Comprehensive Metabolic Panel; Future  - Urinalysis, Reflex to Urine Culture Urine, Clean Catch; Future  BP stable. Med refill.  DASH diet: Eat more fruits, vegetables, and low fat dairy foods.  (Less than 2 grams of sodium per day).  Maintain healthy weight with goal BMI <30.   Exercise 30 minutes per day 5 days per week.  Home medications refilled and continued.   Home BP monitoring encouraged with BP parameters given.       4. Hyperlipidemia, unspecified hyperlipidemia type  - CBC Auto Differential; Future  - Comprehensive Metabolic Panel;  Future  Lab Results   Component Value Date    LDL 58.00 03/18/2022    HDL 48 03/18/2022    TRIG 69 03/18/2022       Cont RX daily; refills given. Take Omega 3 daily.   Stressed importance of dietary modifications. Follow a low cholesterol, low saturated fat diet with less that 200mg of cholesterol a day.  Avoid fried foods and high saturated fats (high saturated fats less than 7% of calories).  Add Flax Seed/Fish Oil supplements to diet. Increase dietary fiber.  Regular exercise can reduce LDL and raise HDL. Stressed importance of physical activity 5 times per week for 30 minutes per day.     5. Senile dementia of Alzheimer's type  - memantine-donepeziL (NAMZARIC) 7-10 mg CSpX; Take 1 capsule by mouth once daily.  Dispense: 90 each; [facility]    6. Vitamin D deficiency  - Vitamin D; Future    7. Prediabetes  - Diabetic Eye Screening Photo; Future  - Hemoglobin A1C; Future   Low carb diet      Medication List with Changes/Refills   Current Medications    BLOOD SUGAR DIAGNOSTIC STRP      glucometer test strips and lancets, See Instructions, use to monitor glucose once a day R73.03, # 50 EA, 6 Refill(s), Pharmacy: [place] [facility] [address] [id number], 165, cm, Height/Length Dosing, 10/19/21 8:59:00 CDT, 48.4, kg, Weight Dosing, 10/19/21 8:59:00 CDT    BLOOD-GLUCOSE METER KIT      Glucometer, See Instructions, use to monitor glucose once a day R73.03, # 1 EA, 0 Refill(s), Pharmacy: [place] [facility] [address] [id number], 165, cm, Height/Length Dosing, 10/19/21 8:59:00 CDT, 48.4, kg, Weight Dosing, 10/19/21 8:59:00 CDT    TRUEPLUS LANCETS 28 GAUGE MISC    Apply topically.   Changed and/or Refilled Medications    Modified Medication Previous Medication    ALENDRONATE (FOSAMAX) 70 MG TABLET alendronate (FOSAMAX) 70 MG tablet       Take 1 tablet (70 mg total) by mouth every 7 days.    TAKE ONE TABLET BY MOUTH ONCE WEEKLY WITH 6-8 OUNCES OF PLAIN WATER AT LEAST 30 MINUTES BEFORE FIRST FOOD, BEVERAGE, OR MEDICATION OF THE DAY     APIXABAN (ELIQUIS) 5 MG TAB apixaban (ELIQUIS) 5 mg Tab       Take 1 tablet (5 mg total) by mouth 2 (two) times daily.    Take 5 mg by mouth.    ATORVASTATIN (LIPITOR) 20 MG TABLET atorvastatin (LIPITOR) 20 MG tablet       Take 1 tablet (20 mg total) by mouth every evening.    TAKE ONE TABLET BY MOUTH AT BEDTIME    DICLOFENAC SODIUM (VOLTAREN) 1 % GEL diclofenac sodium (VOLTAREN) 1 % Gel       Apply 4 g topically 4 (four) times daily as needed (knee pain).    Apply 4 g topically.    LEVOTHYROXINE (SYNTHROID) 112 MCG TABLET levothyroxine (SYNTHROID) 112 MCG tablet       Take 1 tablet (112 mcg total) by mouth before breakfast.    Take 112 mcg by mouth.    LOSARTAN (COZAAR) 25 MG TABLET losartan (COZAAR) 25 MG tablet       Take 1 tablet (25 mg total) by mouth once daily.      See Instructions, TAKE ONE TABLET BY MOUTH EVERY DAY, # 90 tab(s), 1 Refill(s), Pharmacy: [place] [facility] [id number] [id number], 165, cm, Height/Length Dosing, 03/21/22 9:25:00 CDT, 50.4, kg, Weight Dosing, 03/21/22 9:25:00 CDT    MEGESTROL (MEGACE) 400 MG/10 ML (40 MG/ML) SUSP megestroL (MEGACE) 400 mg/10 mL (40 mg/mL) Susp       Take 20 mLs (800 mg total) by mouth once daily.    Take 800 mg by mouth.    MEMANTINE-DONEPEZIL (NAMZARIC) 7-10 MG CSPX NAMZARIC 7-10 mg CSpX       Take 1 capsule by mouth once daily.    TAKE ONE CAPSULE BY MOUTH EVERY DAY    TRAZODONE (DESYREL) 150 MG TABLET traZODone (DESYREL) 150 MG tablet       Take 1 tablet (150 mg total) by mouth nightly.    Take 150 mg by mouth.        Orders Placed This Encounter   Procedures    CBC Auto Differential    Comprehensive Metabolic Panel    Vitamin D    Urinalysis, Reflex to Urine Culture Urine, Clean Catch    T4    TSH    Hemoglobin A1C    Diabetic Eye Screening Photo         Future Appointments   Date Time Provider Department Center   11/1/2022 10:00 AM [facility] [address] [place]   11/10/2022  1:00 PM GERIATRICS, [facility] FAMILY MEDICINE [facility]        Labs thoroughly  reviewed with patient. Medication refills addressed today.  RTC 1 wk virtual for results  COVID 19 precautions given to patient.  Patient voices understanding of all discharge instructions.

## 2022-09-08 ENCOUNTER — OFFICE VISIT (OUTPATIENT)
Dept: INTERNAL MEDICINE | Facility: CLINIC | Age: 77
End: 2022-09-08
Attending: NURSE PRACTITIONER
Payer: MEDICARE

## 2022-09-08 DIAGNOSIS — R73.03 PREDIABETES: ICD-10-CM

## 2022-09-08 DIAGNOSIS — F02.80 SENILE DEMENTIA OF ALZHEIMER'S TYPE: Primary | ICD-10-CM

## 2022-09-08 DIAGNOSIS — E03.9 HYPOTHYROIDISM, UNSPECIFIED TYPE: ICD-10-CM

## 2022-09-08 DIAGNOSIS — I10 HYPERTENSION, UNSPECIFIED TYPE: ICD-10-CM

## 2022-09-08 DIAGNOSIS — E55.9 VITAMIN D DEFICIENCY: ICD-10-CM

## 2022-09-08 DIAGNOSIS — G30.1 SENILE DEMENTIA OF ALZHEIMER'S TYPE: Primary | ICD-10-CM

## 2022-09-08 DIAGNOSIS — E78.5 HYPERLIPIDEMIA, UNSPECIFIED HYPERLIPIDEMIA TYPE: ICD-10-CM

## 2022-09-08 PROCEDURE — 1159F PR MEDICATION LIST DOCUMENTED IN MEDICAL RECORD: ICD-10-PCS | Mod: CPTII,95,, | Performed by: NURSE PRACTITIONER

## 2022-09-08 PROCEDURE — 1101F PR PT FALLS ASSESS DOC 0-1 FALLS W/OUT INJ PAST YR: ICD-10-PCS | Mod: CPTII,95,, | Performed by: NURSE PRACTITIONER

## 2022-09-08 PROCEDURE — 99443 PR PHYSICIAN TELEPHONE EVALUATION 21-30 MIN: CPT | Mod: 95,,, | Performed by: NURSE PRACTITIONER

## 2022-09-08 PROCEDURE — 1160F PR REVIEW ALL MEDS BY PRESCRIBER/CLIN PHARMACIST DOCUMENTED: ICD-10-PCS | Mod: CPTII,95,, | Performed by: NURSE PRACTITIONER

## 2022-09-08 PROCEDURE — 3288F FALL RISK ASSESSMENT DOCD: CPT | Mod: CPTII,95,, | Performed by: NURSE PRACTITIONER

## 2022-09-08 PROCEDURE — 99443 PR PHYSICIAN TELEPHONE EVALUATION 21-30 MIN: ICD-10-PCS | Mod: 95,,, | Performed by: NURSE PRACTITIONER

## 2022-09-08 PROCEDURE — 1101F PT FALLS ASSESS-DOCD LE1/YR: CPT | Mod: CPTII,95,, | Performed by: NURSE PRACTITIONER

## 2022-09-08 PROCEDURE — 1159F MED LIST DOCD IN RCRD: CPT | Mod: CPTII,95,, | Performed by: NURSE PRACTITIONER

## 2022-09-08 PROCEDURE — 1160F RVW MEDS BY RX/DR IN RCRD: CPT | Mod: CPTII,95,, | Performed by: NURSE PRACTITIONER

## 2022-09-08 PROCEDURE — 3288F PR FALLS RISK ASSESSMENT DOCUMENTED: ICD-10-PCS | Mod: CPTII,95,, | Performed by: NURSE PRACTITIONER

## 2022-09-08 NOTE — PROGRESS NOTES
Established Patient - Audio Only Telehealth Visit     The patient location is: home  The chief complaint leading to consultation is: labs  Visit type: Virtual visit with audio only (telephone)  Total time spent with patient: 40       The reason for the audio only service rather than synchronous audio and video virtual visit was related to technical difficulties or patient preference/necessity.     Each patient to whom I provide medical services by telemedicine is:  (1) informed of the relationship between the physician and patient and the respective role of any other health care provider with respect to management of the patient; and (2) notified that they may decline to receive medical services by telemedicine and may withdraw from such care at any time. Patient verbally consented to receive this service via voice-only telephone call.    Internal Medicine Clinic  [facility]     Patient Name: Esthela Marrufo   : 1945  MRN:[id number]     CC:  Chief Complaint   Patient presents with    Follow-up     Lab results  Spoke with Daughter Shakira        HPI  77 year old AAF, presents TM with daughter Shakira for routine lab apt. No acute complaints voiced.   Referred to GERIATRIC clinic by [facility] NEURO clinic, apt 11/10/2022 to establish care. Refills sent until seen by Geriatric clinic.     PMH recurrent DVT (has IVC filter and on Eliquis), HTN, HLD, prediabetes, knee OA, Alzheimer's, Hypothyroidism, H/O total thyroidectomy in 2015, vit d def, osteoporosis. Pt lives with daughter SRIRAM Iglesias. Pt has another daughter, who works full time, but grown great grandchildren, and pt's brother rotate, take turns, staying with elderly pt during the day. Shakira gets off work after 3 pm every day and is able to care for pt during the evenings/nights. Rell HERNANDEZ, discharged pt in 2022. Pt is able to self shower with shower chair, use restroom, and feed herself. She is not able to prepare meals, dress her self,  drive, grocery shop, or pay bills. Previously on Namenda 5 bid, but due to insurance this was switched to donepezil-memantine once daily. Daughter states mentation changes, more confusion in the past 12 months. Denies pt being a physical threat to others or herself. Pt is not oriented to time, but oriented to person and place. Daughter states pt sometimes calls on her   and son, questions if she has children. On Trazodone 150mg at bedtime, sleeping well.  Wakes one time at night to use restroom but returns to bed. States she is taking (2) naps per day each lasting ~2hrs. Denies any recent falls. Pt is using rollator to ambulate. Denies any pain at present but complains of chronic and intermittent left knee swelling, atraumatic. Previously following [facility] for luigi knee OA. Takes Tylenol 1 tab prn pain. She is following Cardiology Dr. Tony Franz. Denies any falls or other syncopal episodes since admit.   Denies chest pain, shortness of breath, cough, fever, headache, dizziness, weakness, abdominal pain, nausea, vomiting, diarrhea, constipation, dysuria, depression, anxiety.     Colonoscopy per Dr. Way 2015;Three small 4 mm polyps scattered throughout the colon. Referred to [facility]; and Dr. Snow ; apt pending. Daughter given phone number to call to schedule.  FIT neg 2019; reordered and never collected.  MMG 10/28/2021; BIRADS 2  DEXA 10/28/2021; Osteoporosis noted Lumbar spine and femoral neck  CT head 2020 -  Age-related atrophy and chronic sequela of white matter  microvascular disease.  MRI brain 2021 No acute intracranial abnormality. Mild chronic microvascular ischemic changes and parenchymal volume  Loss.  ECHO 2021; EF 67 %, mild to moderate mitral and tricuspid regurg, trace pericardial effusion.  Carotid US 2021, less than 50 percent stenosis bilaterally            ROS  Review of Systems   Constitutional: Negative.    HENT: Negative.     Eyes: Negative.     Respiratory: Negative.     Cardiovascular: Negative.    Gastrointestinal: Negative.    Endocrine: Negative.    Genitourinary: Negative.    Musculoskeletal: Negative.    Integumentary:  Negative.   Allergic/Immunologic: Negative.    Neurological: Negative.    Hematological: Negative.    Psychiatric/Behavioral: Negative.     All other systems reviewed and are negative.     Physical Examination:  There were no vitals filed for this visit.       Physical Exam  Nursing note reviewed.   Neurological:      Mental Status: She is alert.   Psychiatric:         Mood and Affect: Mood normal.          Labs/Imaging:  Reviewed    Assessment/Plan:  1. Senile dementia of Alzheimer's type  - memantine-donepeziL (NAMZARIC) 7-10 mg CSpX; Take 1 capsule by mouth once daily.  Dispense: 90 each; Refill: 0  [facility]  2. Hypothyroidism, unspecified type  Lab Results   Component Value Date    TSH 1.6812 08/26/2022     TSH, T4  TFT stable. Euthyroid.    Continue levothyroxine as prescribed.    Take medicine on an empty stomach with water (no other medications or beverages). Wait 30 minutes to eat or drink.    Denies fatigue, increased sensitivity to cold, constipation, dry skin, thinning hair, slowed heart rate, depression, impaired memory, enlarged thyroid gland, weight gain, puffy face, hoarseness, muscle weakness or aches.    3. Hyperlipidemia, unspecified hyperlipidemia type  Lab Results   Component Value Date    LDL 58.00 03/18/2022    HDL 48 03/18/2022    TRIG 69 03/18/2022       Cont RX daily; refills given. Take Omega 3 daily.   Stressed importance of dietary modifications. Follow a low cholesterol, low saturated fat diet with less that 200mg of cholesterol a day.  Avoid fried foods and high saturated fats (high saturated fats less than 7% of calories).  Add Flax Seed/Fish Oil supplements to diet. Increase dietary fiber.  Regular exercise can reduce LDL and raise HDL. Stressed importance of physical activity 5  times per week for 30 minutes per day.     4. Hypertension, unspecified type  DASH diet: Eat more fruits, vegetables, and low fat dairy foods.  (Less than 2 grams of sodium per day).  Maintain healthy weight with goal BMI <30.   Exercise 30 minutes per day 5 days per week.  Home medications refilled and continued.   Home BP monitoring encouraged with BP parameters given.   Meds as prescribed  Sodium Level   Date Value Ref Range Status   08/26/2022 142 136 - 145 mmol/L Final     Potassium Level   Date Value Ref Range Status   08/26/2022 3.5 3.5 - 5.1 mmol/L Final     Blood Urea Nitrogen   Date Value Ref Range Status   08/26/2022 11.6 9.8 - 20.1 mg/dL Final     Creatinine   Date Value Ref Range Status   08/26/2022 0.80 0.55 - 1.02 mg/dL Final     Estimated GFR-Non    Date Value Ref Range Status   03/18/2022 72 >=90          5. Prediabetes  A1C 5.4  Glucose improved  Low sugar in diet  Avoid processed sugars    6. Vitamin D deficiency   VIT D 53 at goal  Continue otc vit d    Medication List with Changes/Refills   Current Medications    ALENDRONATE (FOSAMAX) 70 MG TABLET    Take 1 tablet (70 mg total) by mouth every 7 days.    APIXABAN (ELIQUIS) 5 MG TAB    Take 1 tablet (5 mg total) by mouth 2 (two) times daily.    ATORVASTATIN (LIPITOR) 20 MG TABLET    Take 1 tablet (20 mg total) by mouth every evening.    BLOOD SUGAR DIAGNOSTIC STRP      glucometer test strips and lancets, See Instructions, use to monitor glucose once a day R73.03, # 50 EA, 6 Refill(s), Pharmacy: [place] [address] [id number], 165, cm, Height/Length Dosing, 10/19/21 8:59:00 CDT, 48.4, kg, Weight Dosing, 10/19/21 8:59:00 CDT    BLOOD-GLUCOSE METER KIT      Glucometer, See Instructions, use to monitor glucose once a day R73.03, # 1 EA, 0 Refill(s), Pharmacy: [id number], 165, cm, Height/Length Dosing, 10/19/21 8:59:00 CDT, 48.4, kg, Weight Dosing, 10/19/21 8:59:00 CDT    DICLOFENAC SODIUM (VOLTAREN) 1 % GEL    Apply 4 g topically 4  (four) times daily as needed (knee pain).    LEVOTHYROXINE (SYNTHROID) 112 MCG TABLET    Take 1 tablet (112 mcg total) by mouth before breakfast.    LOSARTAN (COZAAR) 25 MG TABLET    Take 1 tablet (25 mg total) by mouth once daily.    MEGESTROL (MEGACE) 400 MG/10 ML (40 MG/ML) SUSP    Take 20 mLs (800 mg total) by mouth once daily.    MEMANTINE-DONEPEZIL (NAMZARIC) 7-10 MG CSPX    Take 1 capsule by mouth once daily.    TRAZODONE (DESYREL) 150 MG TABLET    Take 1 tablet (150 mg total) by mouth nightly.    TRUEPLUS LANCETS 28 GAUGE MISC    Apply topically.                Future Appointments   Date Time Provider Department Center   11/1/2022 10:00 AM [facility] [address] [place]   11/10/2022  1:00 PM GERIATRICS, [facility] FAMILY MEDICINE [facility]        Labs thoroughly reviewed with patient. Medication refills addressed today.  RTC prn   Follow up [facility] as scheduled  COVID 19 precautions given to patient.  Patient voices understanding of all discharge instructions.                 This service was not originating from a related E/M service provided within the previous 7 days nor will  to an E/M service or procedure within the next 24 hours or my soonest available appointment.  Prevailing standard of care was able to be met in this audio-only visit.

## 2022-11-03 ENCOUNTER — OFFICE VISIT (OUTPATIENT)
Dept: FAMILY MEDICINE | Facility: CLINIC | Age: 77
End: 2022-11-03
Payer: MEDICARE

## 2022-11-03 VITALS
RESPIRATION RATE: 18 BRPM | HEIGHT: 64 IN | TEMPERATURE: 99 F | HEART RATE: 90 BPM | DIASTOLIC BLOOD PRESSURE: 51 MMHG | OXYGEN SATURATION: 98 % | WEIGHT: 108.94 LBS | SYSTOLIC BLOOD PRESSURE: 103 MMHG | BODY MASS INDEX: 18.6 KG/M2

## 2022-11-03 DIAGNOSIS — R41.3 MEMORY LOSS OF UNKNOWN CAUSE: ICD-10-CM

## 2022-11-03 DIAGNOSIS — F03.C11 SEVERE DEMENTIA WITH AGITATION, UNSPECIFIED DEMENTIA TYPE: ICD-10-CM

## 2022-11-03 DIAGNOSIS — E78.5 HYPERLIPIDEMIA, UNSPECIFIED HYPERLIPIDEMIA TYPE: ICD-10-CM

## 2022-11-03 DIAGNOSIS — G47.00 INSOMNIA, UNSPECIFIED TYPE: Primary | ICD-10-CM

## 2022-11-03 DIAGNOSIS — I10 HYPERTENSION, UNSPECIFIED TYPE: ICD-10-CM

## 2022-11-03 PROCEDURE — 99215 OFFICE O/P EST HI 40 MIN: CPT | Mod: PBBFAC | Performed by: STUDENT IN AN ORGANIZED HEALTH CARE EDUCATION/TRAINING PROGRAM

## 2022-11-03 RX ORDER — QUETIAPINE FUMARATE 25 MG/1
25 TABLET, FILM COATED ORAL DAILY PRN
Qty: 30 TABLET | Refills: 2 | Status: SHIPPED | OUTPATIENT
Start: 2022-11-03 | End: 2022-12-05

## 2022-11-03 RX ORDER — TRAZODONE HYDROCHLORIDE 50 MG/1
TABLET ORAL
Qty: 42 TABLET | Refills: 0 | Status: SHIPPED | OUTPATIENT
Start: 2022-11-03 | End: 2023-01-09

## 2022-11-03 NOTE — PATIENT INSTRUCTIONS
Taper Trazodone 100mg for 2 weeks and then 50mg for 2 weeks and then discontinue.  Start Seroquel as needed if physically aggressive while Trazodone being tapered.  Schedule Seroquel daily at 1600 once Trazodone discontinued. Can cause drowsiness.    Will need Baseline EKG before starting Seroquel to monitor for QT prolongation.    Labs: B12, HIV and RPR. Can draw outpatient with other scheduled labs.    HTN: Check BP around noon after medication a couple times a week.
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
## 2022-11-15 DIAGNOSIS — G30.1 SENILE DEMENTIA OF ALZHEIMER'S TYPE: ICD-10-CM

## 2022-11-15 DIAGNOSIS — F02.80 SENILE DEMENTIA OF ALZHEIMER'S TYPE: ICD-10-CM

## 2022-11-15 RX ORDER — MEMANTINE HYDROCHLORIDE AND DONEPEZIL HYDROCHLORIDE 7; 10 MG/1; MG/1
1 CAPSULE ORAL DAILY
Qty: 90 EACH | Refills: 0 | Status: SHIPPED | OUTPATIENT
Start: 2022-11-15 | End: 2023-01-09 | Stop reason: SDUPTHER

## 2022-12-05 ENCOUNTER — OFFICE VISIT (OUTPATIENT)
Dept: FAMILY MEDICINE | Facility: CLINIC | Age: 77
End: 2022-12-05
Payer: MEDICARE

## 2022-12-05 VITALS
DIASTOLIC BLOOD PRESSURE: 61 MMHG | WEIGHT: 108.69 LBS | RESPIRATION RATE: 20 BRPM | HEIGHT: 64 IN | OXYGEN SATURATION: 100 % | BODY MASS INDEX: 18.56 KG/M2 | SYSTOLIC BLOOD PRESSURE: 101 MMHG | TEMPERATURE: 98 F | HEART RATE: 93 BPM

## 2022-12-05 DIAGNOSIS — J02.9 SORE THROAT: ICD-10-CM

## 2022-12-05 DIAGNOSIS — F03.C11 SEVERE DEMENTIA WITH AGITATION, UNSPECIFIED DEMENTIA TYPE: Primary | ICD-10-CM

## 2022-12-05 LAB
ANION GAP SERPL CALC-SCNC: 11 MEQ/L
BUN SERPL-MCNC: 11 MG/DL (ref 9.8–20.1)
CALCIUM SERPL-MCNC: 10.1 MG/DL (ref 8.4–10.2)
CHLORIDE SERPL-SCNC: 107 MMOL/L (ref 98–107)
CO2 SERPL-SCNC: 25 MMOL/L (ref 23–31)
CREAT SERPL-MCNC: 0.75 MG/DL (ref 0.55–1.02)
CREAT/UREA NIT SERPL: 15
FLUAV AG UPPER RESP QL IA.RAPID: NOT DETECTED
FLUBV AG UPPER RESP QL IA.RAPID: NOT DETECTED
GFR SERPLBLD CREATININE-BSD FMLA CKD-EPI: >60 MLS/MIN/1.73/M2
GLUCOSE SERPL-MCNC: 78 MG/DL (ref 82–115)
HIV 1+2 AB+HIV1 P24 AG SERPL QL IA: NONREACTIVE
POTASSIUM SERPL-SCNC: 4 MMOL/L (ref 3.5–5.1)
RSV A 5' UTR RNA NPH QL NAA+PROBE: NOT DETECTED
SARS-COV-2 RNA RESP QL NAA+PROBE: NOT DETECTED
SODIUM SERPL-SCNC: 143 MMOL/L (ref 136–145)
STREP A PCR (OHS): NOT DETECTED
T PALLIDUM AB SER QL: NONREACTIVE
VIT B12 SERPL-MCNC: 1144 PG/ML (ref 213–816)

## 2022-12-05 PROCEDURE — 36415 COLL VENOUS BLD VENIPUNCTURE: CPT | Performed by: FAMILY MEDICINE

## 2022-12-05 PROCEDURE — 87389 HIV-1 AG W/HIV-1&-2 AB AG IA: CPT | Performed by: FAMILY MEDICINE

## 2022-12-05 PROCEDURE — 82607 VITAMIN B-12: CPT | Performed by: FAMILY MEDICINE

## 2022-12-05 PROCEDURE — 93005 ELECTROCARDIOGRAM TRACING: CPT

## 2022-12-05 PROCEDURE — 87651 STREP A DNA AMP PROBE: CPT | Performed by: FAMILY MEDICINE

## 2022-12-05 PROCEDURE — 80048 BASIC METABOLIC PNL TOTAL CA: CPT | Performed by: FAMILY MEDICINE

## 2022-12-05 PROCEDURE — 86780 TREPONEMA PALLIDUM: CPT | Performed by: FAMILY MEDICINE

## 2022-12-05 PROCEDURE — 99214 OFFICE O/P EST MOD 30 MIN: CPT | Mod: PBBFAC | Performed by: FAMILY MEDICINE

## 2022-12-05 PROCEDURE — 0241U COVID/RSV/FLU A&B PCR: CPT | Performed by: FAMILY MEDICINE

## 2022-12-05 RX ORDER — QUETIAPINE FUMARATE 25 MG/1
TABLET, FILM COATED ORAL
Qty: 60 TABLET | Refills: 2 | Status: SHIPPED | OUTPATIENT
Start: 2022-12-05 | End: 2023-01-09 | Stop reason: SDUPTHER

## 2022-12-05 NOTE — PROGRESS NOTES
[facility]  [facility]    DOS: 12/5/2022      Subjective:  Chief Complaint:    Chief Complaint   Patient presents with    Memory Loss     FU       History of Present Illness:  Esthela Marrufo is a 77 y.o. female with a PMH of HTN, HLD, DM2, Dementia likely vascular, A fib, hypothyroidism, osteoporosis,    Who presents today for:  Follow up of Chronic Conditions: Dementia with behavioral disturbances    Daughter Shakira providing most of history.   Patient doing well overall on change with tapering off of trazodone,   Using seroquel approx twice a week as needed - behavior has been much improved, patient is much calmer after use.     Patient also complaining of sore throat since 4 days, no other symptoms - denies fever, chills, cough, SOB, nasal congestion, phlegm production.   Grandson came to visit last week with sore throat also, unsure if strep, no other known sick contacts, no known exposure to COVID or flu.   Patient otherwise eating, drinking, sleeping, acting at her baseline.   Patient complains of right neck tenderness - pointing to right cervical lymph node chain.  Noted to have muffled voice in interview.     Past Medical History:   Diagnosis Date    Dementia     Diabetes mellitus, type 2     Hypertension       Past Surgical History:   Procedure Laterality Date    Cardiac loop recorder  12/13/2021    HYSTERECTOMY      INTRIOR VENA CAVA FILTER      THYROIDECTOMY  04/02/2015      Family History   Problem Relation Age of Onset    Hypertension Mother     Thyroid disease Mother     Stomach cancer Brother     Thyroid disease Brother       Social History     Socioeconomic History    Marital status: Single    Number of children: 6   Occupational History    Occupation: Retired   Tobacco Use    Smoking status: Former     Passive exposure: Never    Smokeless tobacco: Never   Substance and Sexual Activity    Alcohol use: Not Currently    Drug use: Never    Sexual  activity: Not Currently     Partners: Male        Review of patient's allergies indicates:   Allergen Reactions    Aspirin      Other reaction(s): stomach pain    Iodinated contrast media     Tramadol-acetaminophen         Current Outpatient Medications:     alendronate (FOSAMAX) 70 MG tablet, Take 1 tablet (70 mg total) by mouth every 7 days., Disp: 12 tablet, Rfl: 1    atorvastatin (LIPITOR) 20 MG tablet, Take 1 tablet (20 mg total) by mouth every evening., Disp: 90 tablet, Rfl: 1    blood sugar diagnostic Strp,  glucometer test strips and lancets, See Instructions, use to monitor glucose once a day R73.03, # 50 EA, 6 Refill(s), Pharmacy: [id number], 165, cm, Height/Length Dosing, 10/19/21 8:59:00 CDT, 48.4, kg, Weight Dosing, 10/19/21 8:59:00 CDT, Disp: , Rfl:     blood-glucose meter kit,  Glucometer, See Instructions, use to monitor glucose once a day R73.03, # 1 EA, 0 Refill(s), Pharmacy: [facility] [id number], 165, cm, Height/Length Dosing, 10/19/21 8:59:00 CDT, 48.4, kg, Weight Dosing, 10/19/21 8:59:00 CDT, Disp: , Rfl:     diclofenac sodium (VOLTAREN) 1 % Gel, Apply 4 g topically 4 (four) times daily as needed (knee pain)., Disp: 100 g, Rfl: 1    ELIQUIS 5 mg Tab, TAKE ONE TABLET BY MOUTH TWICE A DAY, Disp: 180 tablet, Rfl: 1    levothyroxine (SYNTHROID) 112 MCG tablet, TAKE ONE TABLET BY MOUTH EVERY DAY, Disp: 90 tablet, Rfl: 1    losartan (COZAAR) 25 MG tablet, Take 1 tablet (25 mg total) by mouth once daily., Disp: 90 tablet, Rfl: 1    megestroL (MEGACE) 400 mg/10 mL (40 mg/mL) Susp, Take 20 mLs (800 mg total) by mouth once daily., Disp: 600 mL, Rfl: 2    memantine-donepeziL (NAMZARIC) 7-10 mg CSpX, Take 1 capsule by mouth once daily., Disp: 90 each, Rfl: 0    QUEtiapine (SEROQUEL) 25 MG Tab, Take 1 tablet (25 mg total) by mouth daily as needed (Agitation)., Disp: 30 tablet, Rfl: 2    traZODone (DESYREL) 50 MG tablet, Take 2 tablets (100 mg total) by mouth nightly for 14 days, THEN 1 tablet (50  "mg total) nightly for 14 days., Disp: 42 tablet, Rfl: 0    TRUEPLUS LANCETS 28 gauge Misc, Apply topically., Disp: , Rfl:      Review of Systems:  Review of Systems   Constitutional:  Negative for chills, diaphoresis, fever, malaise/fatigue and weight loss.   HENT:  Positive for sore throat. Negative for congestion, hearing loss and sinus pain.    Eyes:  Negative for blurred vision and discharge.   Respiratory:  Negative for cough, sputum production, shortness of breath and wheezing.    Cardiovascular:  Negative for chest pain, palpitations and leg swelling.   Gastrointestinal:  Negative for abdominal pain, constipation, diarrhea, heartburn, nausea and vomiting.   Genitourinary:  Negative for dysuria.   Musculoskeletal:  Negative for falls and myalgias.   Skin:  Negative for rash.   Neurological:  Negative for dizziness and headaches.   Psychiatric/Behavioral:  Positive for memory loss. The patient has insomnia.       Objective:   Vitals:    12/05/22 1302   BP: 101/61   BP Location: Right arm   Patient Position: Sitting   BP Method: Medium (Automatic)   Pulse: 93   Resp: 20   Temp: 98.1 °F (36.7 °C)   TempSrc: Oral   SpO2: 100%   Weight: 49.3 kg (108 lb 11 oz)   Height: 5' 4.02" (1.626 m)        Physical Exam  Constitutional:       General: She is not in acute distress.     Appearance: Normal appearance. She is normal weight. She is not ill-appearing, toxic-appearing or diaphoretic.   HENT:      Head: Normocephalic and atraumatic.      Nose: Nose normal. No congestion or rhinorrhea.      Mouth/Throat:      Mouth: Mucous membranes are moist.      Pharynx: Oropharynx is clear. No oropharyngeal exudate or posterior oropharyngeal erythema.      Comments: Patient unable to open mouth wide and resisted tongue depressor to fully examine posterior pharynx, but no obvious exudates or erythema  Eyes:      Extraocular Movements: Extraocular movements intact.      Conjunctiva/sclera: Conjunctivae normal.      Pupils: Pupils are " equal, round, and reactive to light.   Neck:      Comments: Right neck tenderness along anterior cervical chain however no enlarged lymph nodes palpated.   Cardiovascular:      Rate and Rhythm: Normal rate and regular rhythm.      Pulses: Normal pulses.      Heart sounds: Normal heart sounds. No murmur heard.  Pulmonary:      Effort: Pulmonary effort is normal. No respiratory distress.      Breath sounds: Normal breath sounds. No wheezing, rhonchi or rales.   Abdominal:      General: Abdomen is flat. Bowel sounds are normal. There is no distension.      Palpations: Abdomen is soft.      Tenderness: There is no abdominal tenderness.   Musculoskeletal:         General: No swelling. Normal range of motion.      Cervical back: Normal range of motion. Tenderness present. No rigidity.   Lymphadenopathy:      Cervical: No cervical adenopathy.   Skin:     General: Skin is warm and dry.   Neurological:      General: No focal deficit present.      Mental Status: She is alert. Mental status is at baseline. She is disoriented.      Motor: Weakness present.      Gait: Gait abnormal.      Comments: BLE weakness at baseline, patient mostly wheelchair bound   Psychiatric:         Mood and Affect: Affect is labile.         Behavior: Behavior is agitated. Behavior is cooperative.         Cognition and Memory: Cognition is impaired. Memory is impaired.         Judgment: Judgment is impulsive.      Comments: Patient mildly agitated but significantly improved from prior visit, cooperative for physical exam and testing.         Recent labs:  CBC:  Lab Results   Component Value Date    WBC 6.5 08/26/2022    RBC 4.37 08/26/2022    HGB 12.0 08/26/2022    HCT 39.6 08/26/2022    MCV 90.6 08/26/2022    MCH 27.5 08/26/2022    MCHC 30.3 (L) 08/26/2022    RDW 13.0 08/26/2022     08/26/2022    MPV 10.6 (H) 08/26/2022      CMP:  Sodium Level   Date Value Ref Range Status   08/26/2022 142 136 - 145 mmol/L Final     Potassium Level   Date  Value Ref Range Status   08/26/2022 3.5 3.5 - 5.1 mmol/L Final     Carbon Dioxide   Date Value Ref Range Status   08/26/2022 26 23 - 31 mmol/L Final     Blood Urea Nitrogen   Date Value Ref Range Status   08/26/2022 11.6 9.8 - 20.1 mg/dL Final     Creatinine   Date Value Ref Range Status   08/26/2022 0.80 0.55 - 1.02 mg/dL Final     Calcium Level Total   Date Value Ref Range Status   08/26/2022 9.7 8.4 - 10.2 mg/dL Final     Albumin Level   Date Value Ref Range Status   08/26/2022 4.1 3.4 - 4.8 gm/dL Final     Bilirubin Total   Date Value Ref Range Status   08/26/2022 0.6 <=1.5 mg/dL Final     Alkaline Phosphatase   Date Value Ref Range Status   08/26/2022 36 (L) 40 - 150 unit/L Final     Aspartate Aminotransferase   Date Value Ref Range Status   08/26/2022 12 5 - 34 unit/L Final     Alanine Aminotransferase   Date Value Ref Range Status   08/26/2022 7 0 - 55 unit/L Final     Estimated GFR-Non    Date Value Ref Range Status   03/18/2022 72 >=90       BMP:  Lab Results   Component Value Date     08/26/2022    K 3.5 08/26/2022    CO2 26 08/26/2022    BUN 11.6 08/26/2022    CREATININE 0.80 08/26/2022    CALCIUM 9.7 08/26/2022    EGFRNONAA 72 03/18/2022      Lipid Panel:  Lab Results   Component Value Date    CHOL 120 03/18/2022     Lab Results   Component Value Date    HDL 48 03/18/2022     No results found for: LDLCALC  Lab Results   Component Value Date    TRIG 69 03/18/2022     No results found for: CHOLHDL   HbA1c:  Lab Results   Component Value Date    HGBA1C 5.4 08/26/2022      TSH:  Lab Results   Component Value Date    TSH 1.6812 08/26/2022       Recent Imaging:  X-Ray Chest PA And Lateral  one view of the chest     CPT 64464     HISTORY:  Shortness of Breath     FINDINGS:  Examination reveals mediastinal and cardiac silhouettes to be within  normal limits. Lung fields are clear and free of gross infiltrates  atelectases or effusions     IMPRESSION: No active pulmonary disease       Electronically Signed By: Lencho Mason MD  Date/Time Signed: 12/12/2021 09:09       Assessment & Plan:    Esthela Marrufo is presenting as above and will be treated as follows:    Esthela was seen today for memory loss.    Diagnoses and all orders for this visit:    Severe dementia with agitation, unspecified dementia type  -     Vitamin B12; Future  -     Basic Metabolic Panel; Future  -     HIV 1/2 Ag/Ab (4th Gen); Future  -     SYPHILIS ANTIBODY (WITH REFLEX RPR); Future  -     IN OFFICE EKG 12-LEAD (to Muse)  -     QUEtiapine (SEROQUEL) 25 MG Tab; Take 1 tablet (25 mg total) by mouth nightly. May also take 1 tablet (25 mg total) daily as needed (Agitation).  -     SYPHILIS ANTIBODY (WITH REFLEX RPR)  -     HIV 1/2 Ag/Ab (4th Gen)  -     Basic Metabolic Panel  -     Vitamin B12    Sore throat  -     COVID/RSV/FLU A&B PCR; Future  -     Cancel: Throat Screen, Rapid Strep  -     COVID/RSV/FLU A&B PCR  -     Strep Group A by PCR; Future  -     Strep Group A by PCR    Labs not drawn from prior visit for initial dementia workup, will draw today.   For Behavioral disturbances, patient doing well with seroquel 25mg and no change in sleep with tapering down of trazodone.  Patient to come off trazodone in few days, instructed daughter to start seroquel 25mg nightly after this, with later timing to allow patient to stay asleep, and monitor for behavior, additional daily prn around 3-4 if needed for sundowning.   EKG taken for QT monitoring on antipsychotic - NSR, Qtc 440    Multiple nasal and throat swabs taken - will follow  Recommend chloraseptic lozenges BID prn while awaiting swab results - patient otherwise well, will not give empiric tx at this time  Daughter given precautions to call if worsening.        Health Maintenance Reviewed:  Immunization History   Administered Date(s) Administered    Influenza 12/13/2012, 10/09/2013    Influenza - High Dose - PF (65 years and older) 10/25/2018, 10/07/2019     Influenza - Quadrivalent - High Dose - PF (65 years and older) 10/19/2021    Influenza - Trivalent - PF (ADULT) 10/20/2014    Pneumococcal Polysaccharide - 23 Valent 07/10/2018    Tdap 03/21/2022    Zoster Recombinant 03/21/2022, 08/26/2022          Fallon Granados MD  Attending - Family Medicine / Geriatric Medicine  [facility]

## 2022-12-05 NOTE — PATIENT INSTRUCTIONS
After trazodone is stopped,     You can start seroquel/quetiapine scheduled nightly, approx 1 hr before bedtime - 830-9pm    Use additional 25mg as needed during the day or around 3-30pm.       For sore throat - use chloraseptic lozenges - morning and evening after meals - make sure it is finished or spit out prior to sleep.

## 2023-01-01 DIAGNOSIS — E78.5 HYPERLIPIDEMIA, UNSPECIFIED HYPERLIPIDEMIA TYPE: ICD-10-CM

## 2023-01-01 DIAGNOSIS — M15.9 PRIMARY OSTEOARTHRITIS INVOLVING MULTIPLE JOINTS: ICD-10-CM

## 2023-01-09 ENCOUNTER — OFFICE VISIT (OUTPATIENT)
Dept: FAMILY MEDICINE | Facility: CLINIC | Age: 78
End: 2023-01-09
Payer: MEDICARE

## 2023-01-09 VITALS
HEIGHT: 64 IN | WEIGHT: 108.44 LBS | RESPIRATION RATE: 20 BRPM | SYSTOLIC BLOOD PRESSURE: 106 MMHG | BODY MASS INDEX: 18.51 KG/M2 | DIASTOLIC BLOOD PRESSURE: 57 MMHG | OXYGEN SATURATION: 97 % | HEART RATE: 80 BPM | TEMPERATURE: 99 F

## 2023-01-09 DIAGNOSIS — I10 HYPERTENSION, UNSPECIFIED TYPE: ICD-10-CM

## 2023-01-09 DIAGNOSIS — F02.80 SENILE DEMENTIA OF ALZHEIMER'S TYPE: ICD-10-CM

## 2023-01-09 DIAGNOSIS — G47.00 INSOMNIA, UNSPECIFIED TYPE: ICD-10-CM

## 2023-01-09 DIAGNOSIS — G30.1 SENILE DEMENTIA OF ALZHEIMER'S TYPE: ICD-10-CM

## 2023-01-09 DIAGNOSIS — Z28.20 VACCINE REFUSED BY PATIENT: ICD-10-CM

## 2023-01-09 DIAGNOSIS — F03.C11 SEVERE DEMENTIA WITH AGITATION, UNSPECIFIED DEMENTIA TYPE: Primary | ICD-10-CM

## 2023-01-09 PROCEDURE — 99213 OFFICE O/P EST LOW 20 MIN: CPT | Mod: PBBFAC | Performed by: FAMILY MEDICINE

## 2023-01-09 RX ORDER — MEMANTINE HYDROCHLORIDE AND DONEPEZIL HYDROCHLORIDE 7; 10 MG/1; MG/1
1 CAPSULE ORAL DAILY
Qty: 90 EACH | Refills: 1 | Status: SHIPPED | OUTPATIENT
Start: 2023-01-09 | End: 2023-08-28 | Stop reason: SDUPTHER

## 2023-01-09 RX ORDER — QUETIAPINE FUMARATE 25 MG/1
TABLET, FILM COATED ORAL
Qty: 90 TABLET | Refills: 5 | Status: SHIPPED | OUTPATIENT
Start: 2023-01-09 | End: 2023-08-28 | Stop reason: SDUPTHER

## 2023-01-09 NOTE — PROGRESS NOTES
[facility]  [facility]    DOS: 1/9/2023      Subjective:  Chief Complaint:    Chief Complaint   Patient presents with    Dementia     FU       History of Present Illness:  Esthela Marrufo is a 77 y.o. female with a PMH of HTN, HLD, DM2, Dementia likely vascular with behavioral disturbances, A fib, hypothyroidism, osteoporosis,    Who presents today for:  Follow up of Chronic Conditions: Dementia with behavioral disturbances    Patient and daughter present, prior URI symptoms from last visit resolved.  Behaviors are at baseline and no further physically violent episodes, but daughter notes patient is not sleeping well and pacing the kitchen looking for food and trying to cook at night and she is concerned she is not sleeping.  She still gets verbally agitated during the day, but it is hard to pinpoint what time and cannot be sure if its sundowning or not.   We discussed stopping trazodone at night and using seroquel instead, as patient had been on higher doses of trazodone in the past with not much efficacy.  We discussed dangers of using both at the same time,     Patient also noted to have borderline low Bps at multiple visits, luis m with pacing patient is fall risk, and for age is below goal for HTN.   Weight remains stable, daughter notes patient with good appetite and eating well.     Patient due for Flu vaccine and PNV 20 but declines all shots at this time.     Labs reviewed from prior visit - HIV, RPR negative, B12 elevated discussed with daughter can cut down supplement to 2-3 times a week, BMP wnl    Past Medical History:   Diagnosis Date    Dementia     Diabetes mellitus, type 2     Hypertension       Past Surgical History:   Procedure Laterality Date    Cardiac loop recorder  12/13/2021    HYSTERECTOMY      INTRIOR VENA CAVA FILTER      THYROIDECTOMY  04/02/2015      Family History   Problem Relation Age of Onset    Hypertension Mother     Thyroid disease  Mother     Stomach cancer Brother     Thyroid disease Brother       Social History     Socioeconomic History    Marital status: Single    Number of children: 6   Occupational History    Occupation: Retired   Tobacco Use    Smoking status: Former     Passive exposure: Never    Smokeless tobacco: Never   Substance and Sexual Activity    Alcohol use: Not Currently    Drug use: Never    Sexual activity: Not Currently     Partners: Male        Review of patient's allergies indicates:   Allergen Reactions    Aspirin      Other reaction(s): stomach pain    Iodinated contrast media     Tramadol-acetaminophen         Current Outpatient Medications:     alendronate (FOSAMAX) 70 MG tablet, Take 1 tablet (70 mg total) by mouth every 7 days., Disp: 12 tablet, Rfl: 1    atorvastatin (LIPITOR) 20 MG tablet, Take 1 tablet (20 mg total) by mouth every evening., Disp: 90 tablet, Rfl: 1    blood sugar diagnostic Strp,  glucometer test strips and lancets, See Instructions, use to monitor glucose once a day R73.03, # 50 EA, 6 Refill(s), Pharmacy: [id number], 165, cm, Height/Length Dosing, 10/19/21 8:59:00 CDT, 48.4, kg, Weight Dosing, 10/19/21 8:59:00 CDT, Disp: , Rfl:     blood-glucose meter kit,  Glucometer, See Instructions, use to monitor glucose once a day R73.03, # 1 EA, 0 Refill(s), Pharmacy: [id number], 165, cm, Height/Length Dosing, 10/19/21 8:59:00 CDT, 48.4, kg, Weight Dosing, 10/19/21 8:59:00 CDT, Disp: , Rfl:     diclofenac sodium (VOLTAREN) 1 % Gel, Apply 4 g topically 4 (four) times daily as needed (knee pain)., Disp: 100 g, Rfl: 1    ELIQUIS 5 mg Tab, TAKE ONE TABLET BY MOUTH TWICE A DAY, Disp: 180 tablet, Rfl: 1    levothyroxine (SYNTHROID) 112 MCG tablet, TAKE ONE TABLET BY MOUTH EVERY DAY, Disp: 90 tablet, Rfl: 1    megestroL (MEGACE) 400 mg/10 mL (40 mg/mL) Susp, Take 20 mLs (800 mg total) by mouth once daily., Disp: 600 mL, Rfl: 2    memantine-donepeziL (NAMZARIC) 7-10 mg CSpX, Take 1 capsule  "by mouth once daily., Disp: 90 each, Rfl: 0    QUEtiapine (SEROQUEL) 25 MG Tab, Take 1 tablet (25 mg total) by mouth 2 (two) times daily. May also take 1 tablet (25 mg total) daily as needed (Agitation)., Disp: 90 tablet, Rfl: 5    TRUEPLUS LANCETS 28 gauge Misc, USE TO MONITOR GLUCOSE ONCE DAILY, Disp: 100 each, Rfl: 6     Review of Systems:  Review of Systems   Constitutional:  Negative for chills and fever.   HENT:  Negative for congestion, sinus pain and sore throat.    Eyes:  Negative for blurred vision.   Respiratory:  Negative for cough, sputum production, shortness of breath and wheezing.    Cardiovascular:  Negative for chest pain and leg swelling.   Gastrointestinal:  Negative for abdominal pain, constipation, diarrhea, nausea and vomiting.   Genitourinary:  Negative for dysuria, frequency and urgency.   Musculoskeletal:  Negative for back pain, falls, joint pain and myalgias.   Neurological:  Negative for dizziness, weakness and headaches.   Psychiatric/Behavioral:  Positive for memory loss. Negative for hallucinations. The patient is nervous/anxious and has insomnia.       Objective:   Vitals:    01/09/23 1243   BP: (!) 106/57   BP Location: Left arm   Patient Position: Sitting   BP Method: Small (Automatic)   Pulse: 80   Resp: 20   Temp: 98.6 °F (37 °C)   TempSrc: Oral   SpO2: 97%   Weight: 49.2 kg (108 lb 7.5 oz)   Height: 5' 4.02" (1.626 m)        Physical Exam  Vitals reviewed.   Constitutional:       General: She is not in acute distress.     Appearance: Normal appearance.   HENT:      Head: Normocephalic and atraumatic.      Right Ear: External ear normal.      Left Ear: External ear normal.      Nose: Nose normal. No congestion or rhinorrhea.      Mouth/Throat:      Mouth: Mucous membranes are moist.      Pharynx: Oropharynx is clear. No oropharyngeal exudate or posterior oropharyngeal erythema.   Eyes:      Extraocular Movements: Extraocular movements intact.      Conjunctiva/sclera: Conjunctivae " normal.      Pupils: Pupils are equal, round, and reactive to light.   Cardiovascular:      Rate and Rhythm: Normal rate and regular rhythm.      Pulses: Normal pulses.      Heart sounds: Normal heart sounds. No murmur heard.  Pulmonary:      Effort: Pulmonary effort is normal. No respiratory distress.      Breath sounds: Normal breath sounds. No wheezing, rhonchi or rales.   Abdominal:      General: Abdomen is flat. Bowel sounds are normal. There is no distension.      Palpations: Abdomen is soft.      Tenderness: There is no abdominal tenderness.   Musculoskeletal:         General: No swelling, tenderness or signs of injury. Normal range of motion.      Cervical back: Normal range of motion and neck supple.   Skin:     General: Skin is warm and dry.   Neurological:      General: No focal deficit present.      Mental Status: She is alert. Mental status is at baseline. She is disoriented.   Psychiatric:         Attention and Perception: She is inattentive. She does not perceive auditory or visual hallucinations.         Mood and Affect: Mood normal. Affect is labile.         Speech: Speech normal.         Behavior: Behavior is uncooperative and agitated.         Cognition and Memory: Cognition is impaired.         Judgment: Judgment is impulsive.        Recent labs:  CBC:  Lab Results   Component Value Date    WBC 6.5 08/26/2022    RBC 4.37 08/26/2022    HGB 12.0 08/26/2022    HCT 39.6 08/26/2022    MCV 90.6 08/26/2022    MCH 27.5 08/26/2022    MCHC 30.3 (L) 08/26/2022    RDW 13.0 08/26/2022     08/26/2022    MPV 10.6 (H) 08/26/2022      CMP:  Sodium Level   Date Value Ref Range Status   12/05/2022 143 136 - 145 mmol/L Final     Potassium Level   Date Value Ref Range Status   12/05/2022 4.0 3.5 - 5.1 mmol/L Final     Carbon Dioxide   Date Value Ref Range Status   12/05/2022 25 23 - 31 mmol/L Final     Blood Urea Nitrogen   Date Value Ref Range Status   12/05/2022 11.0 9.8 - 20.1 mg/dL Final     Creatinine    Date Value Ref Range Status   12/05/2022 0.75 0.55 - 1.02 mg/dL Final     Calcium Level Total   Date Value Ref Range Status   12/05/2022 10.1 8.4 - 10.2 mg/dL Final     Albumin Level   Date Value Ref Range Status   08/26/2022 4.1 3.4 - 4.8 gm/dL Final     Bilirubin Total   Date Value Ref Range Status   08/26/2022 0.6 <=1.5 mg/dL Final     Alkaline Phosphatase   Date Value Ref Range Status   08/26/2022 36 (L) 40 - 150 unit/L Final     Aspartate Aminotransferase   Date Value Ref Range Status   08/26/2022 12 5 - 34 unit/L Final     Alanine Aminotransferase   Date Value Ref Range Status   08/26/2022 7 0 - 55 unit/L Final     Estimated GFR-Non    Date Value Ref Range Status   03/18/2022 72 >=90       BMP:  Lab Results   Component Value Date     12/05/2022    K 4.0 12/05/2022    CO2 25 12/05/2022    BUN 11.0 12/05/2022    CREATININE 0.75 12/05/2022    CALCIUM 10.1 12/05/2022    EGFRNONAA 72 03/18/2022      Lipid Panel:  Lab Results   Component Value Date    CHOL 120 03/18/2022     Lab Results   Component Value Date    HDL 48 03/18/2022     No results found for: LDLCALC  Lab Results   Component Value Date    TRIG 69 03/18/2022     No results found for: CHOLHDL   HbA1c:  Lab Results   Component Value Date    HGBA1C 5.4 08/26/2022      TSH:  Lab Results   Component Value Date    TSH 1.6812 08/26/2022       Recent Imaging:  X-Ray Chest PA And Lateral  one view of the chest     CPT 45183     HISTORY:  Shortness of Breath     FINDINGS:  Examination reveals mediastinal and cardiac silhouettes to be within  normal limits. Lung fields are clear and free of gross infiltrates  atelectases or effusions     IMPRESSION: No active pulmonary disease      Electronically Signed By: Lencho Mason MD  Date/Time Signed: 12/12/2021 09:09       Assessment & Plan:    Esthela Marrufo is presenting as above and will be treated as follows:    Esthela was seen today for dementia.    Diagnoses and all orders for this  visit:    Severe dementia with agitation, unspecified dementia type  -     QUEtiapine (SEROQUEL) 25 MG Tab; Take 1 tablet (25 mg total) by mouth 2 (two) times daily. May also take 1 tablet (25 mg total) daily as needed (Agitation).  EKG done at last visit QTC WNL, not prolonged.    Insomnia, unspecified type  Will switch to quetiapine at night due to wandering behaviors and continue to monitor closely.    Discussed door alarm/bed alarm can order on [employer] to daughter for further monitoring     Senile dementia of Alzheimer's type  -     memantine-donepeziL (NAMZARIC) 7-10 mg CSpX; Take 1 capsule by mouth once daily.    Hypertension, unspecified type  Discontinue losartan as patient with consistently low Bps and fall risk    Vaccine refused by patient  Patient would not allow education as she started to get agitated, will try to discuss again at subsequent visits.     RTC 3 months , daughter to call if meds not working or sooner appointment needed.       Health Maintenance Reviewed:  Immunization History   Administered Date(s) Administered    Influenza 12/13/2012, 10/09/2013    Influenza - High Dose - PF (65 years and older) 10/25/2018, 10/07/2019    Influenza - Quadrivalent - High Dose - PF (65 years and older) 10/19/2021    Influenza - Trivalent - PF (ADULT) 10/20/2014    Pneumococcal Polysaccharide - 23 Valent 07/10/2018    Tdap 03/21/2022    Zoster Recombinant 03/21/2022, 08/26/2022      Fallon Granados MD  Attending - Family Medicine / Geriatric Medicine  [facility], [facility]

## 2023-01-09 NOTE — PATIENT INSTRUCTIONS
STOP losartan    STOP trazodone    Continue quetiapine/seroquel 25mg twice a day, and third as needed in afternoon for behaviors.

## 2023-05-28 NOTE — PROGRESS NOTES
Date of Service: 11/3/22  Attending Attestation: Patient discussed with resident. The chart was reviewed thoroughly including pertinent vitals, labs, imaging, prior notes, and consultant/specialist recommendations.  I participated in the management of the patient, examined the patient, reviewed the summary of the plan, and was immediately available at all times throughout the encounter. Services were furnished in a primary care center located in the outpatient department of a [facility] hospital. I agree with the resident's findings and plan as documented in the resident's note.    Fallon Granados MD  Attending - Family Medicine / Geriatric Medicine  [facility], [facility]

## 2023-05-31 ENCOUNTER — OFFICE VISIT (OUTPATIENT)
Dept: FAMILY MEDICINE | Facility: CLINIC | Age: 78
End: 2023-05-31
Payer: MEDICARE

## 2023-05-31 VITALS
BODY MASS INDEX: 18.1 KG/M2 | HEART RATE: 70 BPM | TEMPERATURE: 98 F | SYSTOLIC BLOOD PRESSURE: 117 MMHG | DIASTOLIC BLOOD PRESSURE: 66 MMHG | OXYGEN SATURATION: 100 % | RESPIRATION RATE: 17 BRPM | WEIGHT: 106 LBS | HEIGHT: 64 IN

## 2023-05-31 DIAGNOSIS — Z28.20 VACCINE REFUSED BY PATIENT: ICD-10-CM

## 2023-05-31 DIAGNOSIS — G47.00 INSOMNIA, UNSPECIFIED TYPE: ICD-10-CM

## 2023-05-31 DIAGNOSIS — I10 HYPERTENSION, UNSPECIFIED TYPE: ICD-10-CM

## 2023-05-31 DIAGNOSIS — F03.C11 SEVERE DEMENTIA WITH AGITATION, UNSPECIFIED DEMENTIA TYPE: Primary | ICD-10-CM

## 2023-05-31 PROCEDURE — 99214 OFFICE O/P EST MOD 30 MIN: CPT | Mod: PBBFAC | Performed by: FAMILY MEDICINE

## 2023-07-11 NOTE — PROGRESS NOTES
[facility]  [facility]    DOS: 5/31/2023      Subjective:  Chief Complaint:    Chief Complaint   Patient presents with    Dementia       History of Present Illness:  Esthela Marrufo is a 78 y.o. 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  Patient and daughter state she is sleeping through the night and not having pacing episodes with improved agitation with increased seroquel dosing and stopping trazodone.      Patient noted to have nail chipped when one broke with jagged edges, but daughter states patient fights her to cut nails, very long and risk for cutting herself or injury.  I tried to convince patient to allow daughter to cut them all and needed to for safety.  She was reluctant.   but daughter notes patient is not sleeping well and pacing the kitchen looking for food and trying to cook at night and she is concerned she is not sleeping.      BP improved after stopping prior losartan.  No falls.   Patient appetite stable, small decrease from 108 to 106 lb from last visit but daughter states she eats well with prompting.  We discussed high calorie foods to add to diet to help maintain weight.     Patient continues to decline all vaccines offered PNA today, patient started to get agitated so did not push further.      Past Medical History:   Diagnosis Date    Dementia     Diabetes mellitus, type 2     Hypertension       Past Surgical History:   Procedure Laterality Date    Cardiac loop recorder  12/13/2021    HYSTERECTOMY      INTRIOR VENA CAVA FILTER      THYROIDECTOMY  04/02/2015      Family History   Problem Relation Age of Onset    Hypertension Mother      Thyroid disease Mother     Stomach cancer Brother     Thyroid disease Brother       Social History     Socioeconomic History    Marital status: Single    Number of children: 6   Occupational History    Occupation: Retired   Tobacco Use    Smoking status: Former     Passive exposure: Never    Smokeless tobacco: Never   Substance and Sexual Activity    Alcohol use: Not Currently    Drug use: Never    Sexual activity: Not Currently     Partners: Male        Review of patient's allergies indicates:   Allergen Reactions    Aspirin      Other reaction(s): stomach pain    Iodinated contrast media     Tramadol-acetaminophen         Current Outpatient Medications:     alendronate (FOSAMAX) 70 MG tablet, Take 1 tablet (70 mg total) by mouth every 7 days., Disp: 12 tablet, Rfl: 1    atorvastatin (LIPITOR) 20 MG tablet, Take 1 tablet (20 mg total) by mouth every evening., Disp: 90 tablet, Rfl: 1    blood sugar diagnostic Strp,  glucometer test strips and lancets, See Instructions, use to monitor glucose once a day R73.03, # 50 EA, 6 Refill(s), Pharmacy: [id number], 165, cm, Height/Length Dosing, 10/19/21 8:59:00 CDT, 48.4, kg, Weight Dosing, 10/19/21 8:59:00 CDT, Disp: , Rfl:     blood-glucose meter kit,  Glucometer, See Instructions, use to monitor glucose once a day R73.03, # 1 EA, 0 Refill(s), Pharmacy: [id number], 165, cm, Height/Length Dosing, 10/19/21 8:59:00 CDT, 48.4, kg, Weight Dosing, 10/19/21 8:59:00 CDT, Disp: , Rfl:     diclofenac sodium (VOLTAREN) 1 % Gel, Apply 4 g topically 4 (four) times daily as needed (knee pain)., Disp: 100 g, Rfl: 1    ELIQUIS 5 mg Tab, TAKE ONE TABLET BY MOUTH TWICE A DAY, Disp: 180 tablet, Rfl: 1    levothyroxine (SYNTHROID) 112 MCG tablet, TAKE ONE TABLET BY MOUTH EVERY DAY, Disp: 90 tablet, Rfl: 1    megestroL (MEGACE) 400 mg/10 mL (40 mg/mL) Susp, Take 20 mLs (800 mg total) by mouth once daily., Disp: 600 mL, Rfl: 2    memantine-donepeziL (NAMZARIC) 7-10 mg  "CSpX, Take 1 capsule by mouth once daily., Disp: 90 each, Rfl: 1    QUEtiapine (SEROQUEL) 25 MG Tab, Take 1 tablet (25 mg total) by mouth 2 (two) times daily. May also take 1 tablet (25 mg total) daily as needed (Agitation)., Disp: 90 tablet, Rfl: 5    TRUEPLUS LANCETS 28 gauge Misc, USE TO MONITOR GLUCOSE ONCE DAILY, Disp: 100 each, Rfl: 6     Review of Systems:  Review of Systems   Constitutional:  Negative for chills and fever.   HENT:  Negative for congestion, sinus pain and sore throat.    Eyes:  Negative for blurred vision.   Respiratory:  Negative for cough, sputum production, shortness of breath and wheezing.    Cardiovascular:  Negative for chest pain and leg swelling.   Gastrointestinal:  Negative for abdominal pain, constipation, diarrhea, nausea and vomiting.   Genitourinary:  Negative for dysuria, frequency and urgency.   Musculoskeletal:  Negative for back pain, falls, joint pain and myalgias.   Neurological:  Negative for dizziness, weakness and headaches.   Psychiatric/Behavioral:  Positive for memory loss. Negative for hallucinations. The patient is not nervous/anxious and does not have insomnia.       Objective:   Vitals:    05/31/23 1531   BP: 117/66   BP Location: Left arm   Patient Position: Sitting   BP Method: Medium (Automatic)   Pulse: 70   Resp: 17   Temp: 98.3 °F (36.8 °C)   TempSrc: Oral   SpO2: 100%   Weight: 48.1 kg (106 lb)   Height: 5' 4.02" (1.626 m)        Physical Exam  Vitals reviewed.   Constitutional:       General: She is not in acute distress.     Appearance: Normal appearance.   HENT:      Head: Normocephalic and atraumatic.      Right Ear: External ear normal.      Left Ear: External ear normal.      Nose: Nose normal. No congestion or rhinorrhea.      Mouth/Throat:      Mouth: Mucous membranes are moist.      Pharynx: Oropharynx is clear. No oropharyngeal exudate or posterior oropharyngeal erythema.   Eyes:      Extraocular Movements: Extraocular movements intact.      " Conjunctiva/sclera: Conjunctivae normal.      Pupils: Pupils are equal, round, and reactive to light.   Cardiovascular:      Rate and Rhythm: Normal rate and regular rhythm.      Pulses: Normal pulses.      Heart sounds: Normal heart sounds. No murmur heard.  Pulmonary:      Effort: Pulmonary effort is normal. No respiratory distress.      Breath sounds: Normal breath sounds. No wheezing, rhonchi or rales.   Abdominal:      General: Abdomen is flat. Bowel sounds are normal. There is no distension.      Palpations: Abdomen is soft.      Tenderness: There is no abdominal tenderness.   Musculoskeletal:         General: No swelling, tenderness or signs of injury. Normal range of motion.      Cervical back: Normal range of motion and neck supple.   Skin:     General: Skin is warm and dry.      Comments: Broken nail to left hand 4th digit with jagged edges.    Neurological:      General: No focal deficit present.      Mental Status: She is alert. Mental status is at baseline. She is disoriented.   Psychiatric:         Attention and Perception: She is attentive. She does not perceive auditory or visual hallucinations.         Mood and Affect: Mood normal. Affect is labile.         Speech: Speech normal.         Behavior: Behavior is not agitated, aggressive or combative. Behavior is cooperative.         Cognition and Memory: Cognition is impaired.         Judgment: Judgment is impulsive.        Recent labs:  CBC:  Lab Results   Component Value Date    WBC 6.5 08/26/2022    RBC 4.37 08/26/2022    HGB 12.0 08/26/2022    HCT 39.6 08/26/2022    MCV 90.6 08/26/2022    MCH 27.5 08/26/2022    MCHC 30.3 (L) 08/26/2022    RDW 13.0 08/26/2022     08/26/2022    MPV 10.6 (H) 08/26/2022      CMP:  Sodium Level   Date Value Ref Range Status   12/05/2022 143 136 - 145 mmol/L Final     Potassium Level   Date Value Ref Range Status   12/05/2022 4.0 3.5 - 5.1 mmol/L Final     Carbon Dioxide   Date Value Ref Range Status   12/05/2022  25 23 - 31 mmol/L Final     Blood Urea Nitrogen   Date Value Ref Range Status   12/05/2022 11.0 9.8 - 20.1 mg/dL Final     Creatinine   Date Value Ref Range Status   12/05/2022 0.75 0.55 - 1.02 mg/dL Final     Calcium Level Total   Date Value Ref Range Status   12/05/2022 10.1 8.4 - 10.2 mg/dL Final     Albumin Level   Date Value Ref Range Status   08/26/2022 4.1 3.4 - 4.8 gm/dL Final     Bilirubin Total   Date Value Ref Range Status   08/26/2022 0.6 <=1.5 mg/dL Final     Alkaline Phosphatase   Date Value Ref Range Status   08/26/2022 36 (L) 40 - 150 unit/L Final     Aspartate Aminotransferase   Date Value Ref Range Status   08/26/2022 12 5 - 34 unit/L Final     Alanine Aminotransferase   Date Value Ref Range Status   08/26/2022 7 0 - 55 unit/L Final     Estimated GFR-Non    Date Value Ref Range Status   03/18/2022 72 >=90       BMP:  Lab Results   Component Value Date     12/05/2022    K 4.0 12/05/2022    CO2 25 12/05/2022    BUN 11.0 12/05/2022    CREATININE 0.75 12/05/2022    CALCIUM 10.1 12/05/2022    EGFRNONAA 72 03/18/2022      Lipid Panel:  Lab Results   Component Value Date    CHOL 120 03/18/2022     Lab Results   Component Value Date    HDL 48 03/18/2022     No results found for: LDLCALC  Lab Results   Component Value Date    TRIG 69 03/18/2022     No results found for: CHOLHDL   HbA1c:  Lab Results   Component Value Date    HGBA1C 5.4 08/26/2022      TSH:  Lab Results   Component Value Date    TSH 1.6812 08/26/2022       Recent Imaging:  X-Ray Chest PA And Lateral  one view of the chest     CPT 33229     HISTORY:  Shortness of Breath     FINDINGS:  Examination reveals mediastinal and cardiac silhouettes to be within  normal limits. Lung fields are clear and free of gross infiltrates  atelectases or effusions     IMPRESSION: No active pulmonary disease      Electronically Signed By: Lencho Mason MD  Date/Time Signed: 12/12/2021 09:09       Assessment & Plan:    Esthela Marrufo is  presenting as above and will be treated as follows:    Esthela was seen today for dementia.    Diagnoses and all orders for this visit:    Severe dementia with agitation, unspecified dementia type  Patient stable on current seroquel 25mg dosing, continue BID.     Insomnia, unspecified type  Resolved with night seroquel dose    Hypertension, unspecified type  Improved with discontinuation of losartan, continue to monitor.     Vaccine refused by patient  Continue to attempt to educate patient at further visits, would need PCV 20  Daughter would like to minimize preventative care such as screening tests and vaccines due to advanced age and dementia to prevent further agitation in patient.     As patient is stable and daughter wants to minimize interventions, okay to space out follow ups  RTC 6 months , daughter to call if meds not working or sooner appointment needed.       Health Maintenance Reviewed:  Immunization History   Administered Date(s) Administered    Influenza 12/13/2012, 10/09/2013    Influenza - High Dose - PF (65 years and older) 10/25/2018, 10/07/2019    Influenza - Quadrivalent - High Dose - PF (65 years and older) 10/19/2021    Influenza - Trivalent - PF (ADULT) 10/20/2014    Pneumococcal Polysaccharide - 23 Valent 07/10/2018    Tdap 03/21/2022    Zoster Recombinant 03/21/2022, 08/26/2022      Fallon Granados MD  Attending - Family Medicine / Geriatric Medicine  [facility] [place], [facility]

## 2023-08-28 DIAGNOSIS — M15.9 PRIMARY OSTEOARTHRITIS INVOLVING MULTIPLE JOINTS: ICD-10-CM

## 2023-08-28 DIAGNOSIS — I48.91 ATRIAL FIBRILLATION, UNSPECIFIED TYPE: ICD-10-CM

## 2023-08-28 DIAGNOSIS — E78.5 HYPERLIPIDEMIA, UNSPECIFIED HYPERLIPIDEMIA TYPE: Primary | ICD-10-CM

## 2023-08-28 DIAGNOSIS — F03.C11 SEVERE DEMENTIA WITH AGITATION, UNSPECIFIED DEMENTIA TYPE: ICD-10-CM

## 2023-08-28 DIAGNOSIS — F02.80 SENILE DEMENTIA OF ALZHEIMER'S TYPE: ICD-10-CM

## 2023-08-28 DIAGNOSIS — G30.1 SENILE DEMENTIA OF ALZHEIMER'S TYPE: ICD-10-CM

## 2023-08-28 DIAGNOSIS — E03.9 HYPOTHYROIDISM, UNSPECIFIED TYPE: ICD-10-CM

## 2023-08-28 RX ORDER — LEVOTHYROXINE SODIUM 112 UG/1
112 TABLET ORAL DAILY
Qty: 90 TABLET | Refills: 1 | OUTPATIENT
Start: 2023-08-28

## 2023-08-28 RX ORDER — MEMANTINE HYDROCHLORIDE AND DONEPEZIL HYDROCHLORIDE 7; 10 MG/1; MG/1
1 CAPSULE ORAL DAILY
Qty: 90 EACH | Refills: 1 | OUTPATIENT
Start: 2023-08-28

## 2023-08-28 RX ORDER — ATORVASTATIN CALCIUM 20 MG/1
20 TABLET, FILM COATED ORAL NIGHTLY
Qty: 90 TABLET | Refills: 1 | OUTPATIENT
Start: 2023-08-28

## 2023-08-28 RX ORDER — QUETIAPINE FUMARATE 25 MG/1
TABLET, FILM COATED ORAL
Qty: 90 TABLET | Refills: 5 | OUTPATIENT
Start: 2023-08-28

## 2023-08-28 RX ORDER — MEMANTINE HYDROCHLORIDE AND DONEPEZIL HYDROCHLORIDE 7; 10 MG/1; MG/1
1 CAPSULE ORAL DAILY
Qty: 90 EACH | Refills: 0 | Status: SHIPPED | OUTPATIENT
Start: 2023-08-28 | End: 2023-11-28 | Stop reason: SDUPTHER

## 2023-08-28 RX ORDER — ALENDRONATE SODIUM 70 MG/1
70 TABLET ORAL
Qty: 12 TABLET | Refills: 1 | OUTPATIENT
Start: 2023-08-28

## 2023-08-28 RX ORDER — QUETIAPINE FUMARATE 25 MG/1
TABLET, FILM COATED ORAL
Qty: 90 TABLET | Refills: 2 | Status: SHIPPED | OUTPATIENT
Start: 2023-08-28 | End: 2024-01-10 | Stop reason: SDUPTHER

## 2023-08-28 RX ORDER — DICLOFENAC SODIUM 10 MG/G
4 GEL TOPICAL 4 TIMES DAILY PRN
Qty: 100 G | Refills: 1 | Status: SHIPPED | OUTPATIENT
Start: 2023-08-28 | End: 2023-09-27 | Stop reason: SDUPTHER

## 2023-08-28 RX ORDER — DICLOFENAC SODIUM 10 MG/G
4 GEL TOPICAL 4 TIMES DAILY PRN
Qty: 100 G | Refills: 1 | OUTPATIENT
Start: 2023-08-28

## 2023-08-28 RX ORDER — ATORVASTATIN CALCIUM 20 MG/1
20 TABLET, FILM COATED ORAL NIGHTLY
Qty: 90 TABLET | Refills: 0 | Status: SHIPPED | OUTPATIENT
Start: 2023-08-28 | End: 2023-11-28 | Stop reason: SDUPTHER

## 2023-08-28 RX ORDER — MEGESTROL ACETATE 40 MG/ML
800 SUSPENSION ORAL DAILY
Qty: 600 ML | Refills: 2 | OUTPATIENT
Start: 2023-08-28

## 2023-08-28 RX ORDER — LEVOTHYROXINE SODIUM 112 UG/1
112 TABLET ORAL DAILY
Qty: 90 TABLET | Refills: 0 | Status: SHIPPED | OUTPATIENT
Start: 2023-08-28

## 2023-08-28 NOTE — TELEPHONE ENCOUNTER
Correction -  this patient is under my care in the geriatrics clinic, was transitioned previously from [facility] at the end of 2022, this will be corrected in the chart, and all refills will be done by myself today and moving forward.      Fallon Granados MD  Attending - Family Medicine / Geriatric Medicine  [facility] [place], [facility] & [facility]

## 2023-09-27 DIAGNOSIS — M15.9 PRIMARY OSTEOARTHRITIS INVOLVING MULTIPLE JOINTS: ICD-10-CM

## 2023-09-28 RX ORDER — DICLOFENAC SODIUM 10 MG/G
4 GEL TOPICAL 4 TIMES DAILY PRN
Qty: 100 G | Refills: 1 | Status: SHIPPED | OUTPATIENT
Start: 2023-09-28 | End: 2024-01-10 | Stop reason: SDUPTHER

## 2023-11-28 DIAGNOSIS — F03.C11 SEVERE DEMENTIA WITH AGITATION, UNSPECIFIED DEMENTIA TYPE: ICD-10-CM

## 2023-11-28 DIAGNOSIS — E78.5 HYPERLIPIDEMIA, UNSPECIFIED HYPERLIPIDEMIA TYPE: ICD-10-CM

## 2023-11-28 DIAGNOSIS — G30.1 SENILE DEMENTIA OF ALZHEIMER'S TYPE: ICD-10-CM

## 2023-11-28 DIAGNOSIS — F02.80 SENILE DEMENTIA OF ALZHEIMER'S TYPE: ICD-10-CM

## 2023-11-28 DIAGNOSIS — I48.91 ATRIAL FIBRILLATION, UNSPECIFIED TYPE: ICD-10-CM

## 2023-12-11 DIAGNOSIS — F02.80 SENILE DEMENTIA OF ALZHEIMER'S TYPE: ICD-10-CM

## 2023-12-11 DIAGNOSIS — G30.1 SENILE DEMENTIA OF ALZHEIMER'S TYPE: ICD-10-CM

## 2023-12-11 DIAGNOSIS — F03.C11 SEVERE DEMENTIA WITH AGITATION, UNSPECIFIED DEMENTIA TYPE: ICD-10-CM

## 2023-12-11 DIAGNOSIS — I48.91 ATRIAL FIBRILLATION, UNSPECIFIED TYPE: ICD-10-CM

## 2023-12-12 RX ORDER — ATORVASTATIN CALCIUM 20 MG/1
20 TABLET, FILM COATED ORAL NIGHTLY
Qty: 30 TABLET | Refills: 0 | Status: SHIPPED | OUTPATIENT
Start: 2023-12-12 | End: 2024-01-10 | Stop reason: SDUPTHER

## 2023-12-12 RX ORDER — MEMANTINE HYDROCHLORIDE AND DONEPEZIL HYDROCHLORIDE 7; 10 MG/1; MG/1
1 CAPSULE ORAL DAILY
Qty: 90 EACH | Refills: 0 | OUTPATIENT
Start: 2023-12-12

## 2023-12-12 RX ORDER — MEMANTINE HYDROCHLORIDE AND DONEPEZIL HYDROCHLORIDE 7; 10 MG/1; MG/1
1 CAPSULE ORAL DAILY
Qty: 30 EACH | Refills: 0 | Status: SHIPPED | OUTPATIENT
Start: 2023-12-12 | End: 2024-01-10

## 2024-01-01 ENCOUNTER — TELEPHONE (OUTPATIENT)
Dept: FAMILY MEDICINE | Facility: CLINIC | Age: 79
End: 2024-01-01
Payer: MEDICARE

## 2024-01-01 ENCOUNTER — OFFICE VISIT (OUTPATIENT)
Dept: FAMILY MEDICINE | Facility: CLINIC | Age: 79
End: 2024-01-01
Payer: MEDICARE

## 2024-01-01 ENCOUNTER — HOSPITAL ENCOUNTER (EMERGENCY)
Facility: HOSPITAL | Age: 79
Discharge: HOME | End: 2024-01-27
Attending: FAMILY MEDICINE
Payer: MEDICARE

## 2024-01-01 ENCOUNTER — HOSPITAL ENCOUNTER (INPATIENT)
Facility: HOSPITAL | Age: 79
LOS: 1 days | Discharge: HOSPICE | DRG: 690 | End: 2024-03-24
Attending: INTERNAL MEDICINE | Admitting: FAMILY MEDICINE
Payer: MEDICARE

## 2024-01-01 VITALS
TEMPERATURE: 98 F | WEIGHT: 115 LBS | RESPIRATION RATE: 18 BRPM | BODY MASS INDEX: 22.58 KG/M2 | HEART RATE: 81 BPM | HEIGHT: 60 IN | SYSTOLIC BLOOD PRESSURE: 145 MMHG | DIASTOLIC BLOOD PRESSURE: 65 MMHG | OXYGEN SATURATION: 100 %

## 2024-01-01 VITALS
WEIGHT: 100 LBS | HEIGHT: 60 IN | SYSTOLIC BLOOD PRESSURE: 112 MMHG | HEART RATE: 78 BPM | HEART RATE: 65 BPM | OXYGEN SATURATION: 100 % | BODY MASS INDEX: 18.78 KG/M2 | TEMPERATURE: 98 F | SYSTOLIC BLOOD PRESSURE: 103 MMHG | WEIGHT: 110 LBS | DIASTOLIC BLOOD PRESSURE: 51 MMHG | RESPIRATION RATE: 20 BRPM | OXYGEN SATURATION: 100 % | BODY MASS INDEX: 19.63 KG/M2 | HEIGHT: 64 IN | DIASTOLIC BLOOD PRESSURE: 57 MMHG | TEMPERATURE: 98 F | RESPIRATION RATE: 20 BRPM

## 2024-01-01 DIAGNOSIS — F02.80 SENILE DEMENTIA OF ALZHEIMER'S TYPE: Primary | ICD-10-CM

## 2024-01-01 DIAGNOSIS — Z76.0 MEDICATION REFILL: ICD-10-CM

## 2024-01-01 DIAGNOSIS — Z01.89 ENCOUNTER FOR GERIATRIC ASSESSMENT: ICD-10-CM

## 2024-01-01 DIAGNOSIS — Z79.899 ENCOUNTER FOR MEDICATION REVIEW: ICD-10-CM

## 2024-01-01 DIAGNOSIS — R41.82 AMS (ALTERED MENTAL STATUS): ICD-10-CM

## 2024-01-01 DIAGNOSIS — R41.0 DELIRIUM: Primary | ICD-10-CM

## 2024-01-01 DIAGNOSIS — I10 HYPERTENSION, UNSPECIFIED TYPE: ICD-10-CM

## 2024-01-01 DIAGNOSIS — F03.C11 SEVERE DEMENTIA WITH AGITATION, UNSPECIFIED DEMENTIA TYPE: ICD-10-CM

## 2024-01-01 DIAGNOSIS — E55.9 VITAMIN D DEFICIENCY, UNSPECIFIED: ICD-10-CM

## 2024-01-01 DIAGNOSIS — E03.9 HYPOTHYROIDISM, UNSPECIFIED TYPE: ICD-10-CM

## 2024-01-01 DIAGNOSIS — G30.0 ALZHEIMER'S DISEASE WITH EARLY ONSET (CODE): Primary | ICD-10-CM

## 2024-01-01 DIAGNOSIS — Z23 NEED FOR VACCINATION: ICD-10-CM

## 2024-01-01 DIAGNOSIS — R63.0 POOR APPETITE: ICD-10-CM

## 2024-01-01 DIAGNOSIS — I48.91 ATRIAL FIBRILLATION, UNSPECIFIED TYPE: ICD-10-CM

## 2024-01-01 DIAGNOSIS — G30.1 SENILE DEMENTIA OF ALZHEIMER'S TYPE: Primary | ICD-10-CM

## 2024-01-01 DIAGNOSIS — Z28.20 VACCINE REFUSED BY PATIENT: ICD-10-CM

## 2024-01-01 DIAGNOSIS — M15.9 PRIMARY OSTEOARTHRITIS INVOLVING MULTIPLE JOINTS: ICD-10-CM

## 2024-01-01 DIAGNOSIS — E78.5 HYPERLIPIDEMIA, UNSPECIFIED HYPERLIPIDEMIA TYPE: ICD-10-CM

## 2024-01-01 DIAGNOSIS — F02.80 SENILE DEMENTIA OF ALZHEIMER'S TYPE: ICD-10-CM

## 2024-01-01 DIAGNOSIS — M25.551 RIGHT HIP PAIN: Primary | ICD-10-CM

## 2024-01-01 DIAGNOSIS — Z86.39 HISTORY OF HYPOTHYROIDISM: ICD-10-CM

## 2024-01-01 DIAGNOSIS — Z72.820 POOR SLEEP: ICD-10-CM

## 2024-01-01 DIAGNOSIS — G30.1 SENILE DEMENTIA OF ALZHEIMER'S TYPE: ICD-10-CM

## 2024-01-01 DIAGNOSIS — R54 FRAIL ELDERLY: ICD-10-CM

## 2024-01-01 LAB
ACINETOBACTER CALCOACETICUS-BAUMANNII COMPLEX (OHS): NOT DETECTED
ALBUMIN SERPL-MCNC: 3.2 G/DL (ref 3.4–4.8)
ALBUMIN SERPL-MCNC: 3.6 G/DL (ref 3.4–4.8)
ALBUMIN SERPL-MCNC: 4.1 G/DL (ref 3.4–4.8)
ALBUMIN SERPL-MCNC: 4.2 G/DL (ref 3.4–4.8)
ALBUMIN/GLOB SERPL: 1 RATIO (ref 1.1–2)
ALBUMIN/GLOB SERPL: 1 RATIO (ref 1.1–2)
ALBUMIN/GLOB SERPL: 1.1 RATIO (ref 1.1–2)
ALBUMIN/GLOB SERPL: 1.2 RATIO (ref 1.1–2)
ALP SERPL-CCNC: 46 UNIT/L (ref 40–150)
ALP SERPL-CCNC: 51 UNIT/L (ref 40–150)
ALP SERPL-CCNC: 57 UNIT/L (ref 40–150)
ALP SERPL-CCNC: 66 UNIT/L (ref 40–150)
ALT SERPL-CCNC: 12 UNIT/L (ref 0–55)
ALT SERPL-CCNC: 7 UNIT/L (ref 0–55)
AMORPH URATE CRY URNS QL MICRO: ABNORMAL /UL
APPEARANCE UR: ABNORMAL
AST SERPL-CCNC: 14 UNIT/L (ref 5–34)
AST SERPL-CCNC: 15 UNIT/L (ref 5–34)
AST SERPL-CCNC: 15 UNIT/L (ref 5–34)
AST SERPL-CCNC: 17 UNIT/L (ref 5–34)
BACTERIA #/AREA URNS AUTO: ABNORMAL /HPF
BACTERIA #/AREA URNS AUTO: ABNORMAL /HPF
BACTERIA BLD CULT: ABNORMAL
BACTERIA BLD CULT: NORMAL
BACTERIA UR CULT: ABNORMAL
BACTERIA UR CULT: NO GROWTH
BACTEROIDES FRAGILIS (OHS): NOT DETECTED
BASOPHILS # BLD AUTO: 0.03 X10(3)/MCL
BASOPHILS NFR BLD AUTO: 0.4 %
BASOPHILS NFR BLD AUTO: 0.4 %
BASOPHILS NFR BLD AUTO: 0.5 %
BASOPHILS NFR BLD AUTO: 0.8 %
BILIRUB SERPL-MCNC: 0.7 MG/DL
BILIRUB SERPL-MCNC: 1 MG/DL
BILIRUB UR QL STRIP.AUTO: NEGATIVE
BILIRUB UR QL STRIP.AUTO: NEGATIVE
BNP BLD-MCNC: <10 PG/ML
BUN SERPL-MCNC: 13 MG/DL (ref 9.8–20.1)
BUN SERPL-MCNC: 17.7 MG/DL (ref 9.8–20.1)
BUN SERPL-MCNC: 20.2 MG/DL (ref 9.8–20.1)
BUN SERPL-MCNC: 7.7 MG/DL (ref 9.8–20.1)
C AURIS DNA BLD POS QL NAA+NON-PROBE: NOT DETECTED
C GATTII+NEOFOR DNA CSF QL NAA+NON-PROBE: NOT DETECTED
CALCIUM SERPL-MCNC: 10.6 MG/DL (ref 8.4–10.2)
CALCIUM SERPL-MCNC: 8.9 MG/DL (ref 8.4–10.2)
CALCIUM SERPL-MCNC: 9.4 MG/DL (ref 8.4–10.2)
CALCIUM SERPL-MCNC: 9.6 MG/DL (ref 8.4–10.2)
CANDIDA ALBICANS (OHS): NOT DETECTED
CANDIDA GLABRATA (OHS): NOT DETECTED
CANDIDA KRUSEI (OHS): NOT DETECTED
CANDIDA PARAPSILOSIS (OHS): NOT DETECTED
CANDIDA TROPICALIS (OHS): NOT DETECTED
CHLORIDE SERPL-SCNC: 104 MMOL/L (ref 98–107)
CHLORIDE SERPL-SCNC: 105 MMOL/L (ref 98–107)
CHLORIDE SERPL-SCNC: 108 MMOL/L (ref 98–107)
CHLORIDE SERPL-SCNC: 108 MMOL/L (ref 98–107)
CHOLEST SERPL-MCNC: 135 MG/DL
CHOLEST/HDLC SERPL: 2 {RATIO} (ref 0–5)
CO2 SERPL-SCNC: 21 MMOL/L (ref 23–31)
CO2 SERPL-SCNC: 26 MMOL/L (ref 23–31)
CO2 SERPL-SCNC: 28 MMOL/L (ref 23–31)
CO2 SERPL-SCNC: 29 MMOL/L (ref 23–31)
COLOR UR AUTO: YELLOW
COLOR UR AUTO: YELLOW
CREAT SERPL-MCNC: 0.46 MG/DL (ref 0.55–1.02)
CREAT SERPL-MCNC: 0.61 MG/DL (ref 0.55–1.02)
CREAT SERPL-MCNC: 0.71 MG/DL (ref 0.55–1.02)
CREAT SERPL-MCNC: 0.75 MG/DL (ref 0.55–1.02)
CTX-M (OHS): ABNORMAL
DEPRECATED CALCIDIOL+CALCIFEROL SERPL-MC: 58.1 NG/ML (ref 30–80)
ENTEROBACTER CLOACAE COMPLEX (OHS): NOT DETECTED
ENTEROBACTERALES (OHS): NOT DETECTED
ENTEROCOCCUS FAECALIS (OHS): NOT DETECTED
ENTEROCOCCUS FAECIUM (OHS): NOT DETECTED
EOSINOPHIL # BLD AUTO: 0.01 X10(3)/MCL (ref 0–0.9)
EOSINOPHIL # BLD AUTO: 0.01 X10(3)/MCL (ref 0–0.9)
EOSINOPHIL # BLD AUTO: 0.07 X10(3)/MCL (ref 0–0.9)
EOSINOPHIL # BLD AUTO: 0.11 X10(3)/MCL (ref 0–0.9)
EOSINOPHIL NFR BLD AUTO: 0.1 %
EOSINOPHIL NFR BLD AUTO: 0.1 %
EOSINOPHIL NFR BLD AUTO: 1.8 %
EOSINOPHIL NFR BLD AUTO: 1.9 %
ERYTHROCYTE [DISTWIDTH] IN BLOOD BY AUTOMATED COUNT: 15.9 % (ref 11.5–17)
ERYTHROCYTE [DISTWIDTH] IN BLOOD BY AUTOMATED COUNT: 16.2 % (ref 11.5–17)
ERYTHROCYTE [DISTWIDTH] IN BLOOD BY AUTOMATED COUNT: 16.7 % (ref 11.5–17)
ERYTHROCYTE [DISTWIDTH] IN BLOOD BY AUTOMATED COUNT: 16.7 % (ref 11.5–17)
ESCHERICHIA COLI (OHS): NOT DETECTED
EST. AVERAGE GLUCOSE BLD GHB EST-MCNC: 114 MG/DL
FLUAV AG UPPER RESP QL IA.RAPID: NOT DETECTED
FLUBV AG UPPER RESP QL IA.RAPID: NOT DETECTED
GFR SERPLBLD CREATININE-BSD FMLA CKD-EPI: >60 MLS/MIN/1.73/M2
GLOBULIN SER-MCNC: 3 GM/DL (ref 2.4–3.5)
GLOBULIN SER-MCNC: 3.5 GM/DL (ref 2.4–3.5)
GLOBULIN SER-MCNC: 3.7 GM/DL (ref 2.4–3.5)
GLOBULIN SER-MCNC: 4.3 GM/DL (ref 2.4–3.5)
GLUCOSE SERPL-MCNC: 113 MG/DL (ref 82–115)
GLUCOSE SERPL-MCNC: 116 MG/DL (ref 82–115)
GLUCOSE SERPL-MCNC: 80 MG/DL (ref 82–115)
GLUCOSE SERPL-MCNC: 87 MG/DL (ref 82–115)
GLUCOSE UR QL STRIP.AUTO: NORMAL
GLUCOSE UR QL STRIP.AUTO: NORMAL
GP B STREP DNA CSF QL NAA+NON-PROBE: NOT DETECTED
GRAM STN SPEC: ABNORMAL
HAEM INFLU DNA CSF QL NAA+NON-PROBE: NOT DETECTED
HBA1C MFR BLD: 5.6 %
HCT VFR BLD AUTO: 32.3 % (ref 37–47)
HCT VFR BLD AUTO: 32.5 % (ref 37–47)
HCT VFR BLD AUTO: 35.5 % (ref 37–47)
HCT VFR BLD AUTO: 42.8 % (ref 37–47)
HDLC SERPL-MCNC: 63 MG/DL (ref 35–60)
HGB BLD-MCNC: 10.7 G/DL (ref 12–16)
HGB BLD-MCNC: 12.7 G/DL (ref 12–16)
HGB BLD-MCNC: 9.7 G/DL (ref 12–16)
HGB BLD-MCNC: 9.8 G/DL (ref 12–16)
HYALINE CASTS #/AREA URNS LPF: ABNORMAL /LPF
HYALINE CASTS #/AREA URNS LPF: ABNORMAL /LPF
IMM GRANULOCYTES # BLD AUTO: 0.01 X10(3)/MCL (ref 0–0.04)
IMM GRANULOCYTES # BLD AUTO: 0.02 X10(3)/MCL (ref 0–0.04)
IMM GRANULOCYTES # BLD AUTO: 0.02 X10(3)/MCL (ref 0–0.04)
IMM GRANULOCYTES # BLD AUTO: 0.03 X10(3)/MCL (ref 0–0.04)
IMM GRANULOCYTES NFR BLD AUTO: 0.3 %
IMM GRANULOCYTES NFR BLD AUTO: 0.4 %
IMP (OHS): ABNORMAL
KETONES UR QL STRIP.AUTO: ABNORMAL
KETONES UR QL STRIP.AUTO: ABNORMAL
KLEBSIELLA AEROGENES (OHS): NOT DETECTED
KLEBSIELLA OXYTOCA (OHS): NOT DETECTED
KLEBSIELLA PNEUMONIAE GROUP (OHS): NOT DETECTED
KPC (OHS): ABNORMAL
L MONOCYTOG DNA CSF QL NAA+NON-PROBE: NOT DETECTED
LACTATE SERPL-SCNC: 1.3 MMOL/L (ref 0.5–2.2)
LACTATE SERPL-SCNC: 2.2 MMOL/L (ref 0.5–2.2)
LACTATE SERPL-SCNC: 2.5 MMOL/L (ref 0.5–2.2)
LDLC SERPL CALC-MCNC: 64 MG/DL (ref 50–140)
LEUKOCYTE ESTERASE UR QL STRIP.AUTO: 250
LEUKOCYTE ESTERASE UR QL STRIP.AUTO: NEGATIVE
LYMPHOCYTES # BLD AUTO: 0.86 X10(3)/MCL (ref 0.6–4.6)
LYMPHOCYTES # BLD AUTO: 0.89 X10(3)/MCL (ref 0.6–4.6)
LYMPHOCYTES # BLD AUTO: 1.03 X10(3)/MCL (ref 0.6–4.6)
LYMPHOCYTES # BLD AUTO: 1.49 X10(3)/MCL (ref 0.6–4.6)
LYMPHOCYTES NFR BLD AUTO: 11.9 %
LYMPHOCYTES NFR BLD AUTO: 14.3 %
LYMPHOCYTES NFR BLD AUTO: 20.2 %
LYMPHOCYTES NFR BLD AUTO: 28.7 %
MAGNESIUM SERPL-MCNC: 1.7 MG/DL (ref 1.6–2.6)
MAGNESIUM SERPL-MCNC: 1.8 MG/DL (ref 1.6–2.6)
MAGNESIUM SERPL-MCNC: 2.1 MG/DL (ref 1.6–2.6)
MCH RBC QN AUTO: 24.6 PG (ref 27–31)
MCH RBC QN AUTO: 24.9 PG (ref 27–31)
MCH RBC QN AUTO: 24.9 PG (ref 27–31)
MCH RBC QN AUTO: 25.1 PG (ref 27–31)
MCHC RBC AUTO-ENTMCNC: 29.7 G/DL (ref 33–36)
MCHC RBC AUTO-ENTMCNC: 30 G/DL (ref 33–36)
MCHC RBC AUTO-ENTMCNC: 30.1 G/DL (ref 33–36)
MCHC RBC AUTO-ENTMCNC: 30.2 G/DL (ref 33–36)
MCR-1 (OHS): ABNORMAL
MCV RBC AUTO: 81.8 FL (ref 80–94)
MCV RBC AUTO: 82.7 FL (ref 80–94)
MCV RBC AUTO: 83.3 FL (ref 80–94)
MCV RBC AUTO: 83.8 FL (ref 80–94)
MECA/C (OHS): ABNORMAL
MECA/C AND MREJ (MRSA)(OHS): ABNORMAL
MONOCYTES # BLD AUTO: 0.46 X10(3)/MCL (ref 0.1–1.3)
MONOCYTES # BLD AUTO: 0.83 X10(3)/MCL (ref 0.1–1.3)
MONOCYTES # BLD AUTO: 0.84 X10(3)/MCL (ref 0.1–1.3)
MONOCYTES # BLD AUTO: 0.87 X10(3)/MCL (ref 0.1–1.3)
MONOCYTES NFR BLD AUTO: 11.2 %
MONOCYTES NFR BLD AUTO: 11.8 %
MONOCYTES NFR BLD AUTO: 12.8 %
MONOCYTES NFR BLD AUTO: 13.8 %
MUCOUS THREADS URNS QL MICRO: ABNORMAL /LPF
MUCOUS THREADS URNS QL MICRO: ABNORMAL /LPF
N MEN DNA CSF QL NAA+NON-PROBE: NOT DETECTED
NDM (OHS): ABNORMAL
NEUTROPHILS # BLD AUTO: 1.99 X10(3)/MCL (ref 2.1–9.2)
NEUTROPHILS # BLD AUTO: 4.16 X10(3)/MCL (ref 2.1–9.2)
NEUTROPHILS # BLD AUTO: 4.96 X10(3)/MCL (ref 2.1–9.2)
NEUTROPHILS # BLD AUTO: 5.7 X10(3)/MCL (ref 2.1–9.2)
NEUTROPHILS NFR BLD AUTO: 55.5 %
NEUTROPHILS NFR BLD AUTO: 67.2 %
NEUTROPHILS NFR BLD AUTO: 69.3 %
NEUTROPHILS NFR BLD AUTO: 76 %
NITRITE UR QL STRIP.AUTO: NEGATIVE
NITRITE UR QL STRIP.AUTO: NEGATIVE
NRBC BLD AUTO-RTO: 0 %
OHS QRS DURATION: 86 MS
OHS QTC CALCULATION: 462 MS
OXA-48-LIKE (OHS): ABNORMAL
PH UR STRIP.AUTO: 5.5 [PH]
PH UR STRIP.AUTO: 6 [PH]
PHOSPHATE SERPL-MCNC: 2.5 MG/DL (ref 2.3–4.7)
PHOSPHATE SERPL-MCNC: 2.6 MG/DL (ref 2.3–4.7)
PHOSPHATE SERPL-MCNC: 4 MG/DL (ref 2.3–4.7)
PLATELET # BLD AUTO: 274 X10(3)/MCL (ref 130–400)
PLATELET # BLD AUTO: 291 X10(3)/MCL (ref 130–400)
PLATELET # BLD AUTO: 315 X10(3)/MCL (ref 130–400)
PLATELET # BLD AUTO: 352 X10(3)/MCL (ref 130–400)
PMV BLD AUTO: 10.5 FL (ref 7.4–10.4)
PMV BLD AUTO: 10.7 FL (ref 7.4–10.4)
PMV BLD AUTO: 10.7 FL (ref 7.4–10.4)
PMV BLD AUTO: 11.2 FL (ref 7.4–10.4)
POCT GLUCOSE: 104 MG/DL (ref 70–110)
POCT GLUCOSE: 112 MG/DL (ref 70–110)
POCT GLUCOSE: 132 MG/DL (ref 70–110)
POCT GLUCOSE: 87 MG/DL (ref 70–110)
POCT GLUCOSE: 87 MG/DL (ref 70–110)
POCT GLUCOSE: 90 MG/DL (ref 70–110)
POCT GLUCOSE: 98 MG/DL (ref 70–110)
POTASSIUM SERPL-SCNC: 3.4 MMOL/L (ref 3.5–5.1)
POTASSIUM SERPL-SCNC: 3.5 MMOL/L (ref 3.5–5.1)
POTASSIUM SERPL-SCNC: 3.8 MMOL/L (ref 3.5–5.1)
POTASSIUM SERPL-SCNC: 4.3 MMOL/L (ref 3.5–5.1)
PROT SERPL-MCNC: 6.2 GM/DL (ref 5.8–7.6)
PROT SERPL-MCNC: 7.3 GM/DL (ref 5.8–7.6)
PROT SERPL-MCNC: 7.6 GM/DL (ref 5.8–7.6)
PROT SERPL-MCNC: 8.5 GM/DL (ref 5.8–7.6)
PROT UR QL STRIP.AUTO: ABNORMAL
PROT UR QL STRIP.AUTO: ABNORMAL
PROTEUS SPP. (OHS): NOT DETECTED
PSEUDOMONAS AERUGINOSA (OHS): NOT DETECTED
RBC # BLD AUTO: 3.93 X10(6)/MCL (ref 4.2–5.4)
RBC # BLD AUTO: 3.95 X10(6)/MCL (ref 4.2–5.4)
RBC # BLD AUTO: 4.26 X10(6)/MCL (ref 4.2–5.4)
RBC # BLD AUTO: 5.11 X10(6)/MCL (ref 4.2–5.4)
RBC #/AREA URNS AUTO: ABNORMAL /HPF
RBC #/AREA URNS AUTO: ABNORMAL /HPF
RBC UR QL AUTO: ABNORMAL
RBC UR QL AUTO: NEGATIVE
S ENT+BONG DNA STL QL NAA+NON-PROBE: NOT DETECTED
S PNEUM DNA CSF QL NAA+NON-PROBE: NOT DETECTED
SARS-COV-2 RNA RESP QL NAA+PROBE: NOT DETECTED
SERRATIA MARCESCENS (OHS): NOT DETECTED
SODIUM SERPL-SCNC: 142 MMOL/L (ref 136–145)
SODIUM SERPL-SCNC: 142 MMOL/L (ref 136–145)
SODIUM SERPL-SCNC: 144 MMOL/L (ref 136–145)
SODIUM SERPL-SCNC: 144 MMOL/L (ref 136–145)
SP GR UR STRIP.AUTO: 1.03 (ref 1–1.03)
SP GR UR STRIP.AUTO: 1.03 (ref 1–1.03)
SQUAMOUS #/AREA URNS LPF: ABNORMAL /HPF
SQUAMOUS #/AREA URNS LPF: ABNORMAL /HPF
STAPHYLOCOCCUS AUREUS (OHS): NOT DETECTED
STAPHYLOCOCCUS EPIDERMIDIS (OHS): NOT DETECTED
STAPHYLOCOCCUS LUGDUNENSIS (OHS): NOT DETECTED
STAPHYLOCOCCUS SPP. (OHS): DETECTED
STENOTROPHOMONAS MALTOPHILIA (OHS): NOT DETECTED
STREPTOCOCCUS PYOGENES (GROUP A)(OHS): NOT DETECTED
STREPTOCOCCUS SPP. (OHS): NOT DETECTED
T4 FREE SERPL-MCNC: 1.09 NG/DL (ref 0.7–1.48)
T4 FREE SERPL-MCNC: 1.19 NG/DL (ref 0.7–1.48)
TRIGL SERPL-MCNC: 42 MG/DL (ref 37–140)
TROPONIN I SERPL-MCNC: <0.01 NG/ML (ref 0–0.04)
TSH SERPL-ACNC: 1.59 UIU/ML (ref 0.35–4.94)
TSH SERPL-ACNC: 2.87 UIU/ML (ref 0.35–4.94)
UNIDENT CRYS #/AREA URNS HPF: ABNORMAL /HPF
UROBILINOGEN UR STRIP-ACNC: ABNORMAL
UROBILINOGEN UR STRIP-ACNC: NORMAL
VANA/B (OHS): ABNORMAL
VIM (OHS): ABNORMAL
VIT B12 SERPL-MCNC: 1017 PG/ML (ref 213–816)
VLDLC SERPL CALC-MCNC: 8 MG/DL
WBC # SPEC AUTO: 3.59 X10(3)/MCL (ref 4.5–11.5)
WBC # SPEC AUTO: 6.01 X10(3)/MCL (ref 4.5–11.5)
WBC # SPEC AUTO: 7.38 X10(3)/MCL (ref 4.5–11.5)
WBC # SPEC AUTO: 7.5 X10(3)/MCL (ref 4.5–11.5)
WBC #/AREA URNS AUTO: ABNORMAL /HPF
WBC #/AREA URNS AUTO: ABNORMAL /HPF
YEAST BUDDING URNS QL: ABNORMAL /HPF

## 2024-01-01 PROCEDURE — 80053 COMPREHEN METABOLIC PANEL: CPT

## 2024-01-01 PROCEDURE — 83605 ASSAY OF LACTIC ACID: CPT | Performed by: INTERNAL MEDICINE

## 2024-01-01 PROCEDURE — 94761 N-INVAS EAR/PLS OXIMETRY MLT: CPT

## 2024-01-01 PROCEDURE — 80053 COMPREHEN METABOLIC PANEL: CPT | Performed by: FAMILY MEDICINE

## 2024-01-01 PROCEDURE — 82607 VITAMIN B-12: CPT | Performed by: FAMILY MEDICINE

## 2024-01-01 PROCEDURE — 84443 ASSAY THYROID STIM HORMONE: CPT | Performed by: FAMILY MEDICINE

## 2024-01-01 PROCEDURE — 21400001 HC TELEMETRY ROOM

## 2024-01-01 PROCEDURE — 80053 COMPREHEN METABOLIC PANEL: CPT | Performed by: INTERNAL MEDICINE

## 2024-01-01 PROCEDURE — 97162 PT EVAL MOD COMPLEX 30 MIN: CPT

## 2024-01-01 PROCEDURE — 25000003 PHARM REV CODE 250: Performed by: INTERNAL MEDICINE

## 2024-01-01 PROCEDURE — 83735 ASSAY OF MAGNESIUM: CPT

## 2024-01-01 PROCEDURE — 84439 ASSAY OF FREE THYROXINE: CPT | Performed by: FAMILY MEDICINE

## 2024-01-01 PROCEDURE — 93005 ELECTROCARDIOGRAM TRACING: CPT

## 2024-01-01 PROCEDURE — 84439 ASSAY OF FREE THYROXINE: CPT

## 2024-01-01 PROCEDURE — 83036 HEMOGLOBIN GLYCOSYLATED A1C: CPT | Performed by: FAMILY MEDICINE

## 2024-01-01 PROCEDURE — 25000003 PHARM REV CODE 250

## 2024-01-01 PROCEDURE — 81001 URINALYSIS AUTO W/SCOPE: CPT | Performed by: FAMILY MEDICINE

## 2024-01-01 PROCEDURE — 85025 COMPLETE CBC W/AUTO DIFF WBC: CPT

## 2024-01-01 PROCEDURE — 87154 CUL TYP ID BLD PTHGN 6+ TRGT: CPT | Performed by: INTERNAL MEDICINE

## 2024-01-01 PROCEDURE — 81001 URINALYSIS AUTO W/SCOPE: CPT | Performed by: INTERNAL MEDICINE

## 2024-01-01 PROCEDURE — 96361 HYDRATE IV INFUSION ADD-ON: CPT

## 2024-01-01 PROCEDURE — 99215 OFFICE O/P EST HI 40 MIN: CPT | Mod: PBBFAC,25 | Performed by: FAMILY MEDICINE

## 2024-01-01 PROCEDURE — 90694 VACC AIIV4 NO PRSRV 0.5ML IM: CPT | Mod: PBBFAC

## 2024-01-01 PROCEDURE — 87040 BLOOD CULTURE FOR BACTERIA: CPT | Performed by: INTERNAL MEDICINE

## 2024-01-01 PROCEDURE — 99285 EMERGENCY DEPT VISIT HI MDM: CPT | Mod: 25

## 2024-01-01 PROCEDURE — 83880 ASSAY OF NATRIURETIC PEPTIDE: CPT | Performed by: INTERNAL MEDICINE

## 2024-01-01 PROCEDURE — 63600175 PHARM REV CODE 636 W HCPCS

## 2024-01-01 PROCEDURE — 84443 ASSAY THYROID STIM HORMONE: CPT

## 2024-01-01 PROCEDURE — 99283 EMERGENCY DEPT VISIT LOW MDM: CPT

## 2024-01-01 PROCEDURE — 84100 ASSAY OF PHOSPHORUS: CPT

## 2024-01-01 PROCEDURE — 0240U COVID/FLU A&B PCR: CPT | Performed by: INTERNAL MEDICINE

## 2024-01-01 PROCEDURE — 82306 VITAMIN D 25 HYDROXY: CPT | Performed by: FAMILY MEDICINE

## 2024-01-01 PROCEDURE — 85025 COMPLETE CBC W/AUTO DIFF WBC: CPT | Performed by: FAMILY MEDICINE

## 2024-01-01 PROCEDURE — 87086 URINE CULTURE/COLONY COUNT: CPT | Performed by: FAMILY MEDICINE

## 2024-01-01 PROCEDURE — 36415 COLL VENOUS BLD VENIPUNCTURE: CPT | Performed by: FAMILY MEDICINE

## 2024-01-01 PROCEDURE — 83735 ASSAY OF MAGNESIUM: CPT | Performed by: INTERNAL MEDICINE

## 2024-01-01 PROCEDURE — 84484 ASSAY OF TROPONIN QUANT: CPT | Performed by: INTERNAL MEDICINE

## 2024-01-01 PROCEDURE — 63600175 PHARM REV CODE 636 W HCPCS: Performed by: INTERNAL MEDICINE

## 2024-01-01 PROCEDURE — 87086 URINE CULTURE/COLONY COUNT: CPT

## 2024-01-01 PROCEDURE — 84100 ASSAY OF PHOSPHORUS: CPT | Performed by: INTERNAL MEDICINE

## 2024-01-01 PROCEDURE — 96365 THER/PROPH/DIAG IV INF INIT: CPT

## 2024-01-01 PROCEDURE — 85025 COMPLETE CBC W/AUTO DIFF WBC: CPT | Performed by: INTERNAL MEDICINE

## 2024-01-01 PROCEDURE — 80061 LIPID PANEL: CPT | Performed by: FAMILY MEDICINE

## 2024-01-01 PROCEDURE — G0008 ADMIN INFLUENZA VIRUS VAC: HCPCS | Mod: PBBFAC

## 2024-01-01 RX ORDER — TRAZODONE HYDROCHLORIDE 50 MG/1
50 TABLET ORAL NIGHTLY
Status: DISCONTINUED | OUTPATIENT
Start: 2024-01-01 | End: 2024-01-01 | Stop reason: HOSPADM

## 2024-01-01 RX ORDER — ACETAMINOPHEN 325 MG/1
650 TABLET ORAL EVERY 4 HOURS PRN
Status: DISCONTINUED | OUTPATIENT
Start: 2024-01-01 | End: 2024-01-01

## 2024-01-01 RX ORDER — LEVOTHYROXINE SODIUM 112 UG/1
112 TABLET ORAL
Status: DISCONTINUED | OUTPATIENT
Start: 2024-01-01 | End: 2024-01-01 | Stop reason: HOSPADM

## 2024-01-01 RX ORDER — ATORVASTATIN CALCIUM 20 MG/1
20 TABLET, FILM COATED ORAL NIGHTLY
Qty: 90 TABLET | Refills: 0 | OUTPATIENT
Start: 2024-01-01

## 2024-01-01 RX ORDER — QUETIAPINE FUMARATE 25 MG/1
TABLET, FILM COATED ORAL
Qty: 60 TABLET | Refills: 0 | Status: SHIPPED | OUTPATIENT
Start: 2024-01-01 | End: 2024-01-01 | Stop reason: SDUPTHER

## 2024-01-01 RX ORDER — QUETIAPINE FUMARATE 25 MG/1
25 TABLET, FILM COATED ORAL EVERY MORNING
Status: DISCONTINUED | OUTPATIENT
Start: 2024-01-01 | End: 2024-01-01

## 2024-01-01 RX ORDER — SODIUM,POTASSIUM PHOSPHATES 280-250MG
1 POWDER IN PACKET (EA) ORAL ONCE
Status: COMPLETED | OUTPATIENT
Start: 2024-01-01 | End: 2024-01-01

## 2024-01-01 RX ORDER — LABETALOL HCL 20 MG/4 ML
20 SYRINGE (ML) INTRAVENOUS EVERY 4 HOURS PRN
Status: DISCONTINUED | OUTPATIENT
Start: 2024-01-01 | End: 2024-01-01 | Stop reason: HOSPADM

## 2024-01-01 RX ORDER — INSULIN ASPART 100 [IU]/ML
0-5 INJECTION, SOLUTION INTRAVENOUS; SUBCUTANEOUS
Status: DISCONTINUED | OUTPATIENT
Start: 2024-01-01 | End: 2024-01-01 | Stop reason: HOSPADM

## 2024-01-01 RX ORDER — DICLOFENAC SODIUM 10 MG/G
4 GEL TOPICAL 4 TIMES DAILY PRN
Qty: 100 G | Refills: 1 | Status: SHIPPED | OUTPATIENT
Start: 2024-01-01

## 2024-01-01 RX ORDER — IBUPROFEN 200 MG
24 TABLET ORAL
Status: DISCONTINUED | OUTPATIENT
Start: 2024-01-01 | End: 2024-01-01 | Stop reason: HOSPADM

## 2024-01-01 RX ORDER — SODIUM CHLORIDE 0.9 % (FLUSH) 0.9 %
10 SYRINGE (ML) INJECTION EVERY 12 HOURS PRN
Status: DISCONTINUED | OUTPATIENT
Start: 2024-01-01 | End: 2024-01-01 | Stop reason: HOSPADM

## 2024-01-01 RX ORDER — QUETIAPINE FUMARATE 25 MG/1
25 TABLET, FILM COATED ORAL 2 TIMES DAILY
Status: DISCONTINUED | OUTPATIENT
Start: 2024-01-01 | End: 2024-01-01 | Stop reason: HOSPADM

## 2024-01-01 RX ORDER — SODIUM CHLORIDE, SODIUM LACTATE, POTASSIUM CHLORIDE, CALCIUM CHLORIDE 600; 310; 30; 20 MG/100ML; MG/100ML; MG/100ML; MG/100ML
INJECTION, SOLUTION INTRAVENOUS CONTINUOUS
Status: DISCONTINUED | OUTPATIENT
Start: 2024-01-01 | End: 2024-01-01 | Stop reason: HOSPADM

## 2024-01-01 RX ORDER — NALOXONE HCL 0.4 MG/ML
0.02 VIAL (ML) INJECTION
Status: DISCONTINUED | OUTPATIENT
Start: 2024-01-01 | End: 2024-01-01 | Stop reason: HOSPADM

## 2024-01-01 RX ORDER — IBUPROFEN 200 MG
16 TABLET ORAL
Status: DISCONTINUED | OUTPATIENT
Start: 2024-01-01 | End: 2024-01-01 | Stop reason: HOSPADM

## 2024-01-01 RX ORDER — HYDRALAZINE HYDROCHLORIDE 20 MG/ML
10 INJECTION INTRAMUSCULAR; INTRAVENOUS EVERY 4 HOURS PRN
Status: DISCONTINUED | OUTPATIENT
Start: 2024-01-01 | End: 2024-01-01 | Stop reason: HOSPADM

## 2024-01-01 RX ORDER — TALC
6 POWDER (GRAM) TOPICAL NIGHTLY PRN
Status: DISCONTINUED | OUTPATIENT
Start: 2024-01-01 | End: 2024-01-01 | Stop reason: HOSPADM

## 2024-01-01 RX ORDER — ATORVASTATIN CALCIUM 20 MG/1
20 TABLET, FILM COATED ORAL NIGHTLY
Status: DISCONTINUED | OUTPATIENT
Start: 2024-01-01 | End: 2024-01-01 | Stop reason: HOSPADM

## 2024-01-01 RX ORDER — TRAZODONE HYDROCHLORIDE 50 MG/1
50 TABLET ORAL NIGHTLY
Qty: 30 TABLET | Refills: 0 | Status: SHIPPED | OUTPATIENT
Start: 2024-01-01 | End: 2024-01-01 | Stop reason: SDUPTHER

## 2024-01-01 RX ORDER — ATORVASTATIN CALCIUM 20 MG/1
20 TABLET, FILM COATED ORAL NIGHTLY
Qty: 90 TABLET | Refills: 1 | Status: SHIPPED | OUTPATIENT
Start: 2024-01-01 | End: 2024-07-08

## 2024-01-01 RX ORDER — QUETIAPINE FUMARATE 25 MG/1
50 TABLET, FILM COATED ORAL NIGHTLY
Status: DISCONTINUED | OUTPATIENT
Start: 2024-01-01 | End: 2024-01-01

## 2024-01-01 RX ORDER — GLUCAGON 1 MG
1 KIT INJECTION
Status: DISCONTINUED | OUTPATIENT
Start: 2024-01-01 | End: 2024-01-01 | Stop reason: HOSPADM

## 2024-01-01 RX ORDER — TRAZODONE HYDROCHLORIDE 50 MG/1
50 TABLET ORAL NIGHTLY
Qty: 30 TABLET | Refills: 2 | Status: SHIPPED | OUTPATIENT
Start: 2024-01-01

## 2024-01-01 RX ORDER — ALENDRONATE SODIUM 70 MG/1
70 TABLET ORAL
Qty: 24 TABLET | Refills: 1 | Status: SHIPPED | OUTPATIENT
Start: 2024-01-01

## 2024-01-01 RX ORDER — ACETAMINOPHEN 325 MG/1
650 TABLET ORAL EVERY 8 HOURS PRN
Status: DISCONTINUED | OUTPATIENT
Start: 2024-01-01 | End: 2024-01-01 | Stop reason: HOSPADM

## 2024-01-01 RX ORDER — QUETIAPINE FUMARATE 25 MG/1
TABLET, FILM COATED ORAL
Qty: 90 TABLET | Refills: 2 | Status: SHIPPED | OUTPATIENT
Start: 2024-01-01

## 2024-01-01 RX ORDER — MAGNESIUM SULFATE 1 G/100ML
1 INJECTION INTRAVENOUS ONCE
Status: COMPLETED | OUTPATIENT
Start: 2024-01-01 | End: 2024-01-01

## 2024-01-01 RX ORDER — CEPHALEXIN 500 MG/1
500 CAPSULE ORAL EVERY 12 HOURS
Qty: 10 CAPSULE | Refills: 0 | Status: SHIPPED | OUTPATIENT
Start: 2024-01-01 | End: 2024-01-01

## 2024-01-01 RX ORDER — DICLOFENAC SODIUM 10 MG/G
GEL TOPICAL
Qty: 100 G | Refills: 1 | OUTPATIENT
Start: 2024-01-01

## 2024-01-01 RX ADMIN — CEFTRIAXONE SODIUM 1 G: 1 INJECTION, POWDER, FOR SOLUTION INTRAMUSCULAR; INTRAVENOUS at 01:03

## 2024-01-01 RX ADMIN — INFLUENZA A VIRUS A/VICTORIA/4897/2022 IVR-238 (H1N1) ANTIGEN (FORMALDEHYDE INACTIVATED), INFLUENZA A VIRUS A/DARWIN/6/2021 IVR-227 (H3N2) ANTIGEN (FORMALDEHYDE INACTIVATED), INFLUENZA B VIRUS B/AUSTRIA/1359417/2021 BVR-26 ANTIGEN (FORMALDEHYDE INACTIVATED), INFLUENZA B VIRUS B/PHUKET/3073/2013 BVR-1B ANTIGEN (FORMALDEHYDE INACTIVATED) 0.5 ML: 15; 15; 15; 15 INJECTION, SUSPENSION INTRAMUSCULAR at 10:01

## 2024-01-01 RX ADMIN — POTASSIUM & SODIUM PHOSPHATES POWDER PACK 280-160-250 MG 1 PACKET: 280-160-250 PACK at 10:03

## 2024-01-01 RX ADMIN — SODIUM CHLORIDE, POTASSIUM CHLORIDE, SODIUM LACTATE AND CALCIUM CHLORIDE 500 ML: 600; 310; 30; 20 INJECTION, SOLUTION INTRAVENOUS at 11:03

## 2024-01-01 RX ADMIN — SODIUM CHLORIDE, POTASSIUM CHLORIDE, SODIUM LACTATE AND CALCIUM CHLORIDE 500 ML: 600; 310; 30; 20 INJECTION, SOLUTION INTRAVENOUS at 09:03

## 2024-01-01 RX ADMIN — APIXABAN 5 MG: 2.5 TABLET, FILM COATED ORAL at 08:03

## 2024-01-01 RX ADMIN — QUETIAPINE FUMARATE 25 MG: 25 TABLET ORAL at 08:03

## 2024-01-01 RX ADMIN — CEFTRIAXONE SODIUM 1 G: 1 INJECTION, POWDER, FOR SOLUTION INTRAMUSCULAR; INTRAVENOUS at 11:03

## 2024-01-01 RX ADMIN — SODIUM CHLORIDE, POTASSIUM CHLORIDE, SODIUM LACTATE AND CALCIUM CHLORIDE: 600; 310; 30; 20 INJECTION, SOLUTION INTRAVENOUS at 04:03

## 2024-01-01 RX ADMIN — MAGNESIUM SULFATE IN DEXTROSE 1 G: 10 INJECTION, SOLUTION INTRAVENOUS at 08:03

## 2024-01-01 RX ADMIN — LEVOTHYROXINE SODIUM 112 MCG: 112 TABLET ORAL at 05:03

## 2024-01-01 RX ADMIN — SODIUM CHLORIDE, POTASSIUM CHLORIDE, SODIUM LACTATE AND CALCIUM CHLORIDE: 600; 310; 30; 20 INJECTION, SOLUTION INTRAVENOUS at 06:03

## 2024-01-01 RX ADMIN — TRAZODONE HYDROCHLORIDE 50 MG: 50 TABLET ORAL at 08:03

## 2024-01-01 RX ADMIN — SODIUM CHLORIDE, POTASSIUM CHLORIDE, SODIUM LACTATE AND CALCIUM CHLORIDE: 600; 310; 30; 20 INJECTION, SOLUTION INTRAVENOUS at 11:03

## 2024-01-01 RX ADMIN — POTASSIUM & SODIUM PHOSPHATES POWDER PACK 280-160-250 MG 1 PACKET: 280-160-250 PACK at 08:03

## 2024-01-01 RX ADMIN — LEVOTHYROXINE SODIUM 112 MCG: 112 TABLET ORAL at 06:03

## 2024-01-01 RX ADMIN — SODIUM CHLORIDE, POTASSIUM CHLORIDE, SODIUM LACTATE AND CALCIUM CHLORIDE: 600; 310; 30; 20 INJECTION, SOLUTION INTRAVENOUS at 01:03

## 2024-01-01 RX ADMIN — SODIUM CHLORIDE 500 ML: 9 INJECTION, SOLUTION INTRAVENOUS at 11:03

## 2024-01-01 RX ADMIN — ATORVASTATIN CALCIUM 20 MG: 20 TABLET, FILM COATED ORAL at 08:03

## 2024-01-01 RX ADMIN — CEFTRIAXONE SODIUM 1 G: 1 INJECTION, POWDER, FOR SOLUTION INTRAMUSCULAR; INTRAVENOUS at 10:03

## 2024-01-10 NOTE — PROGRESS NOTES
[facility]  [facility]    DOS: 1/10/2024      Subjective:  Chief Complaint:    Chief Complaint   Patient presents with    Dementia        79 y.o. female PMH HTN, HLD, dementia likely vascular with behavioral disturbances, Afib, hypothyroidism, osteoporosis. Presents with dgtrShakira. Last seen 5/2023. No recent ER/urgent care visits, no acute complaints    Presents today for FU of dementia     dementia with behavioral disturbances  behaviors at baseline, not physically violent but grabs dgtr arm and is mean   Not sleeping through the night, pacing    HTN   BP at home occasional is at goal, at goal in clinic   No longer on losartan      Guerita assessment:  no recent falls  appetite is good but some days not, takes Glucerne but will eat sweets any day   sleep is not good, broken  bowel/bladder normal and has no complaints    ADLs/iADLS -   Independent dressing, transferring,   Dgtr bathing, cleaning, cooking, managing finances  Medications managed by dgtr   Wears incontinence   no longer driving  ambulates with walker, wheelchair for long distances   Vision with out glasses  No hearing aids   Dentures, but doesn't want to wear   memory is getting worse  no behavioral issues per   Reports visual hallucinations but pleasant, dgtr denies auditory hallucinations  No elopement  lives with dgtr Shakira and marina who is 17yo; no HH/PCA/PT      Review of patient's allergies indicates:   Allergen Reactions    Aspirin      Other reaction(s): stomach pain    Iodinated contrast media     Tramadol-acetaminophen         Current Outpatient Medications:     blood sugar diagnostic Strp,  glucometer test strips and lancets, See Instructions, use to monitor glucose once a day R73.03, # 50 EA, 6 Refill(s), Pharmacy: [facility] [id number] [id number], 165, cm, Height/Length Dosing, 10/19/21 8:59:00 CDT, 48.4, kg, Weight Dosing, 10/19/21 8:59:00 CDT, Disp: , Rfl:     blood-glucose meter kit,  Glucometer,  See Instructions, use to monitor glucose once a day R73.03, # 1 EA, 0 Refill(s), Pharmacy: [id number] [id number], 165, cm, Height/Length Dosing, 10/19/21 8:59:00 CDT, 48.4, kg, Weight Dosing, 10/19/21 8:59:00 CDT, Disp: , Rfl:     levothyroxine (SYNTHROID) 112 MCG tablet, Take 1 tablet (112 mcg total) by mouth once daily., Disp: 90 tablet, Rfl: 0    alendronate (FOSAMAX) 70 MG tablet, Take 1 tablet (70 mg total) by mouth every 7 days., Disp: 24 tablet, Rfl: 1    apixaban (ELIQUIS) 5 mg Tab, Take 1 tablet (5 mg total) by mouth 2 (two) times daily., Disp: 180 tablet, Rfl: 1    atorvastatin (LIPITOR) 20 MG tablet, Take 1 tablet (20 mg total) by mouth every evening., Disp: 90 tablet, Rfl: 1    diclofenac sodium (VOLTAREN) 1 % Gel, Apply 4 g topically 4 (four) times daily as needed (knee pain)., Disp: 100 g, Rfl: 1    QUEtiapine (SEROQUEL) 25 MG Tab, Take 1 tablet (25 mg total) by mouth once daily AND 2 tablets (50 mg total) every evening., Disp: 90 tablet, Rfl: 2    traZODone (DESYREL) 50 MG tablet, Take 1 tablet (50 mg total) by mouth every evening., Disp: 30 tablet, Rfl: 2    TRUEPLUS LANCETS 28 gauge Misc, USE TO MONITOR GLUCOSE ONCE DAILY, Disp: 100 each, Rfl: 6     ROS:   Patient denies HA, dizziness, chest pain, SOB, palpitations,  GI/ Sx, rashes   Reports sleep disturbances, and occasional  agitation    Objective:   VS WNL    General: no acute distress  CVS: irreg irreg rhythm, radial pulses 2+ BL   Resp: CAT no distress  GI: BS WNL nonTTP   Neuro: not oriented to person, place, time; intermittent orientation to dgtr  Psych:  approp and coop         Assessment & Plan:    Medication review, refill  Geriatric asessment   Need for vaccination     Flu given today     Stopped Megase/megestrol, Namzaric/memantine-donepezile  Refilled all medications   Will add trazodone at night for two weeks  If does not work, will incr seroquel to 50mg in pm and keep 25mg at am  6w FU otherwise virtual for medication efficacy      Alzheimer dementia  Severe dementia with agitation, unspecified dementia type  Insomnia, unspecified type  Poor appetite  Poor sleep   cont seroquel 25mg BID  Insomnia resolved with night seroquel dose  Sleep hygiene discussed, materials sent   Empty bladder before bed; decr fluids after   Decr caffeine, no intake after noon   Caregiver burnout discussed  Discussed reorienting, baby doll, soft music    Hypertension, unspecified type  No longer req medications     Vaccine refused by patient  needs PCV 20, will repeat   Daughter would like to minimize preventative care such as screening tests and vaccines due to advanced age and dementia to prevent further agitation in patient.     CBC grossly WNL 8/2022; repeat pending   BMP grossly WNL 12/2022; repeat pending  Lipid grossly WNL 3/2022; repeat pending  A1c WNL 8/2022; repeat pending  Vit D WNL 8/2022; repeat pending  B12 high 12/2022; repeat pending  TSH, T4 WNL 8/2022; repeat pending    Hep not in current EMR  HIV, RPR WNL 12/2022    PAP no longer indicated  MMG w/nathan BIRADS 2 BL 10/2021, no longer indicated   MRI brain w/o 7/2021:     mild chronic microvascular ischemic changes, parenchymal volume loss  DEXA 10/2021: stable osteoporosis  CT CH not indicated, longer than 20y since  Colonoscopy none seen in EMR; no longer indicated, no Sx    Vaccinations:  COVID not done   Flu last 10/2021  PCV 23 7/2018  PCV 20 needed  Tdap 3/2022  Shingrix x2 8/2022    Will call with lab results, address levothyroxine dose  RTC 6w on phone, discuss medication efficacy and address seroquel and trazodone refills [given one month]    DIONNA Marques MD HO-VIII  [facility] Family Medicine Geriatric Fellow

## 2024-01-10 NOTE — PATIENT INSTRUCTIONS
Stopped Megase/megestrol, Namzaric/memantine-donepezil    Refilled all medications     Will add trazodone at night for two weeks  If does not help, increase seroquel to 50mg in pm and keep 25mg at am    6w FU otherwise virtual for medication efficacy     Call with any questions    [facility]  [phone]

## 2024-01-11 NOTE — TELEPHONE ENCOUNTER
Spoke with dgtr, Shakira    Verified pt name and      Discussed lab results, all of which were grossly WNL other than H&H    Dgtr states pt has some bleeding with brushing mouth and occasional ext hemorrhoids with constipation     Advised to be aware and and alert  Discussed methods to reduce incidence of constipation during visit  Reiterated decr caffeine, incr water intake as well as fruit and vegetable matter, incr movement    Consider Miralax or similar alt if above ineffective    Consider repeat if pt will allow at FU     DIONNA Marques MD HO-VIII  [facility] Geriatric Fellow   \

## 2024-01-28 NOTE — ED PROVIDER NOTES
"Encounter Date: 1/27/2024       History     Chief Complaint   Patient presents with    Abscess     Right lateral thigh abscess x2-3 days     78-year-old female with a history of dementia, diabetes and hypertension, was brought to the emergency department by her children after they noticed her right hip was "sticking out" more than the left side and they were concerned that it may be an abscess.  Daughter states she noticed it 2-3 days ago.  Patient denies pain.  The patient's daughter states that she lives with her and denies injury or fall.     The history is provided by the patient and a relative. No  was used.     Review of patient's allergies indicates:   Allergen Reactions    Aspirin      Other reaction(s): stomach pain    Iodinated contrast media     Tramadol-acetaminophen      Past Medical History:   Diagnosis Date    Dementia     Diabetes mellitus, type 2     Hypertension      Past Surgical History:   Procedure Laterality Date    Cardiac loop recorder  12/13/2021    HYSTERECTOMY      INTRIOR VENA CAVA FILTER      THYROIDECTOMY  04/02/2015     Family History   Problem Relation Age of Onset    Hypertension Mother     Thyroid disease Mother     Stomach cancer Brother     Thyroid disease Brother      Social History     Tobacco Use    Smoking status: Former     Passive exposure: Never    Smokeless tobacco: Never   Substance Use Topics    Alcohol use: Not Currently    Drug use: Never     Review of Systems   Constitutional:  Negative for chills and fever.   HENT: Negative.     Eyes: Negative.    Respiratory:  Negative for cough, chest tightness and shortness of breath.    Cardiovascular:  Negative for chest pain, palpitations and leg swelling.   Gastrointestinal:  Negative for abdominal pain, diarrhea, nausea and vomiting.   Genitourinary:  Negative for decreased urine volume and dysuria.   Musculoskeletal:         Right hip prominent    Skin:  Negative for color change, pallor, rash and wound. "       Physical Exam     Initial Vitals [01/27/24 2049]   BP Pulse Resp Temp SpO2   126/67 72 20 97 °F (36.1 °C) 100 %      MAP       --         Physical Exam    Nursing note and vitals reviewed.  Constitutional: She appears well-developed and well-nourished.   HENT:   Head: Normocephalic and atraumatic.   Nose: Nose normal.   Eyes: Conjunctivae and EOM are normal. Pupils are equal, round, and reactive to light.   Neck: Neck supple.   Normal range of motion.  Cardiovascular:  Normal rate, regular rhythm, normal heart sounds and intact distal pulses.           Pulmonary/Chest: Breath sounds normal. No respiratory distress. She has no wheezes.   Abdominal: Abdomen is soft. Bowel sounds are normal. There is no abdominal tenderness. There is no rebound and no guarding.   Musculoskeletal:         General: Normal range of motion.      Cervical back: Normal range of motion and neck supple.      Comments: Full ROM bi hips with no pain.  Right hip bone prominent (partially due to position), no erythema, no tenderness to palpation     Neurological: She is alert.   Skin: Skin is warm. Capillary refill takes less than 2 seconds. No rash and no abscess noted. No erythema. No pallor.   No abscess, no signs of infection or injury         ED Course   Procedures  Labs Reviewed - No data to display       Imaging Results              X-Ray Hip 1 View Right (with Pelvis when performed) (Final result)  Result time 01/27/24 21:39:21   Procedure changed from X-Ray Hip 2 or 3 views Right (with Pelvis when performed)     Final result by Sandor Awad MD (01/27/24 21:39:21)                   Impression:      No acute osseous abnormality identified.      Electronically signed by: Sandor Awad  Date:    01/27/2024  Time:    21:39               Narrative:    EXAMINATION:  XR HIP WITH PELVIS WHEN PERFORMED, 1 VIEW RIGHT    CLINICAL HISTORY:  right hip pain;    TECHNIQUE:  One view    COMPARISON:  Left hip radiographs August 29,  "2017    FINDINGS:  On limited single projection radiograph of the right hip, no definite acute fracture or dislocation identified.  No significant degenerative arthritic changes.  Demineralization of the bones.                                       Medications - No data to display  Medical Decision Making  78-year-old female with a history of dementia, diabetes and hypertension, was brought to the emergency department by her children after they noticed her right hip was "sticking out" more than the left side and they were concerned that it may be an abscess.  Daughter states she noticed it 2-3 days ago.  Patient denies pain.  The patient's daughter states that she lives with her and denies injury or fall.     X-ray right hip taken with only 1 view due to patient combative and unwilling to cooperate imaging.  No osseous abnormality identified.  No tenderness on exam, no erythema, no abscess.  Gave family reassurance that right hip bone is more prominent due to patient's positioning when standing.  They will follow up with her PCP.    Amount and/or Complexity of Data Reviewed  Radiology: ordered. Decision-making details documented in ED Course.  Discussion of management or test interpretation with external provider(s): Dr. Huynh examined patient, full ROM bi hip.   No abnormality found.                Attending Attestation:     Physician Attestation Statement for NP/PA:   I personally made/approved the management plan and take responsibility for the patient management.    Other NP/PA Attestation Additions:    History of Present Illness: I have reviewed the the PAs documentation, agree with the PAs assessment, and I had face-to-face time with the patient.  I personally made/approved the management plan for this patient and take responsibility for the patient management.    79 y/o female presenting due to concerns of family of hip pain.   Physical Exam: Awake and alert, no distress, pleasant, appears comfortable " and in no distress.  Cardiovascular: S1, S2, Regular rate and rhythm  Respiratory: Lungs clear to auscultation bilaterally  Abdomen: Soft, non-tender, non-distended.  Musculoskeletal: Full range of motion of both hips without restriction.  No tenderness to palpation.  No soft tissue swelling.   Medical Decision Making: Good range of motion of both hips; no acute x-ray abnormality appreciated.  Reassurance given to patient and family.             ED Course as of 01/27/24 2324   Sat Jan 27, 2024 2142 X-Ray Hip 1 View Right (with Pelvis when performed)  No acute osseous abnormality identified [ER]      ED Course User Index  [ER] Rachael Corral PA                           Clinical Impression:  Final diagnoses:  [M25.551] Right hip pain (Primary)          ED Disposition Condition    Discharge Stable          ED Prescriptions    None       Follow-up Information       Follow up With Specialties Details Why Contact Info    [facility] Emergency Medicine  As needed, If symptoms worsen [address] [facility] [address]  [phone]    Fallon Granados MD Family Medicine Schedule an appointment as soon as possible for a visit in 2 days  [address] [facility] [address]  [phone]               Rachael Corral PA  01/27/24 232       Roberto Carlos Huynh MD  02/19/24 0604

## 2024-02-29 NOTE — TELEPHONE ENCOUNTER
Dr Thompson, the patients dtr left a message requesting some cream for her bottom, she stated that she has a small sore on it.  Please advise.

## 2024-03-12 NOTE — TELEPHONE ENCOUNTER
Dr Thompson the patients dtr Shakira called and stated that she is not eating or drinking like she was before.  She stated that she is declining.  Ms Rosenberg asked that you please give her a call.

## 2024-03-12 NOTE — TELEPHONE ENCOUNTER
Spoke with daughter Shakira, patient discussed over the phone approx 2 weeks ago with no overt signs and symptoms of delirium or infection, patient overall doing well with medications and no worsening of behaviors. Daughter concerns for over the past 1 week, patient not wanting to get out of wheelchair and not walking much, also decreased appetite and oral intake of solids and liquids.  For example, this morning patient had a little bit of water and applesauce only for breakfast.  Daughter states she has not noticed any respiratory symptoms and no behavioral changes otherwise, denies noting foul odor or color change of urine and patient herself has not stated any particular complaints.  Would want to rule out UTI and examine patient, but daughter states due to patient not wanting to leave wheelchair it is difficult for her to transfer to car to bring her in.  Daughter will come and  urine specimen cup today, and drop it back next morning for analysis, then will discuss if other organic causes ruled out, will need goals of care discussion for worsening progression of dementia and consider hospice referral at that point.     Fallon Granados MD  Attending - Family Medicine / Geriatric Medicine  [facility] - [place], [facility] & [facility]

## 2024-03-14 NOTE — TELEPHONE ENCOUNTER
Urine returned from home collect on 3/14 - sent to lab results pending.     Fallon Granados MD  Attending - Family Medicine / Geriatric Medicine  [facility] - [place], [facility] & [facility]

## 2024-03-23 NOTE — PROGRESS NOTES
[facility] Medicine Wards Progress Note     Resident Team: [facility] FM List   Attending Physician: Manan Marques MD  Resident: Cristobal Banegas MD  Intern: Erika Juarez DO        Subjective:      Brief HPI:  79 y.o. female with a past medical history of DM-2, Dementia, HTN presenting to ED via EMS for AMS. Pt was brought in with family, who is not present during exam. Per ED physician, family reported decreased oral intake, increased AMS, and combativeness at home. No fever, vomiting, diarrhea. Per chart review, pt's daughter contacted PCP on 3/12/24 for concerns of decreased oral intake and generalized weakness.      In ED, patient arrived tachycardic and tachypneic, afebrile, O2 100%.  Labs revealed lactic acid of 2.5, WBC wnl. UA with few bacteria, WBC, 4+ ketones, 2+ protein, negative leuk esterase and nitrites. CXR obtained, no effusions or consolidations noted. IVF and Rocephin initiated. FM consulted for evaluation.    Interval History:   No overnight events. VSS. Daughter at bedside this morning and reports decreased urine output, no urine since 4pm yesterday. Bladder scan completed at bedside this morning with 145mL residual volume. Otherwise,improving mental status and oral intake this morning.      Review of Systems:  ROS completed and negative except as indicated above.     Objective:     Last 24 Hour Vital Signs:  BP  Min: 110/91  Max: 156/67  Temp  Av.5 °F (36.4 °C)  Min: 96.3 °F (35.7 °C)  Max: 98.3 °F (36.8 °C)  Pulse  Av  Min: 69  Max: 129  Resp  Av.8  Min: 13  Max: 25  SpO2  Av.6 %  Min: 98 %  Max: 100 %  Height  Av' (152.4 cm)  Min: 5' (152.4 cm)  Max: 5' (152.4 cm)  Weight  Av.2 kg (115 lb)  Min: 52.2 kg (115 lb)  Max: 52.2 kg (115 lb)  I/O last 3 completed shifts:  In: 1100 [IV Piggyback:1100]  Out: 150 [Urine:150]    Physical Examination:  Physical Exam  Vitals reviewed.   Constitutional:       Appearance: She is not toxic-appearing.      Comments: Cachectic,  chronically-ill appearing   HENT:      Mouth/Throat:      Mouth: Mucous membranes are dry.   Cardiovascular:      Rate and Rhythm: Normal rate and regular rhythm.   Pulmonary:      Effort: Pulmonary effort is normal.      Breath sounds: Normal breath sounds. No wheezing, rhonchi or rales.   Abdominal:      General: There is no distension.      Palpations: Abdomen is soft.   Musculoskeletal:      Right lower leg: No edema.      Left lower leg: No edema.   Skin:     General: Skin is warm and dry.      Comments: Superficial sacral wounds   Neurological:      General: No focal deficit present.      Mental Status: She is alert.      Comments: Demented at baseline           Laboratory:  Most Recent Data:  CBC:   Lab Results   Component Value Date    WBC 7.50 03/23/2024    HGB 10.7 (L) 03/23/2024    HCT 35.5 (L) 03/23/2024     03/23/2024    MCV 83.3 03/23/2024    RDW 16.7 03/23/2024     WBC Differential:   Recent Labs   Lab 03/22/24  2144 03/23/24  0233   WBC 7.38 7.50   HGB 12.7 10.7*   HCT 42.8 35.5*    315   MCV 83.8 83.3     BMP:   Lab Results   Component Value Date     03/23/2024    K 3.5 03/23/2024    CO2 26 03/23/2024    BUN 17.7 03/23/2024    CREATININE 0.61 03/23/2024    CALCIUM 9.6 03/23/2024    MG 1.80 03/23/2024    PHOS 2.6 03/23/2024     LFTs:   Lab Results   Component Value Date    ALBUMIN 3.6 03/23/2024    BILITOT 0.7 03/23/2024    AST 14 03/23/2024    ALKPHOS 57 03/23/2024    ALT 7 03/23/2024     FLP:   Lab Results   Component Value Date    CHOL 135 01/10/2024    HDL 63 (H) 01/10/2024    TRIG 42 01/10/2024     DM:   Lab Results   Component Value Date    HGBA1C 5.6 01/10/2024    HGBA1C 5.4 08/26/2022    CREATININE 0.61 03/23/2024     Thyroid:   Lab Results   Component Value Date    TSH 2.872 03/22/2024     Cardiac:   Lab Results   Component Value Date    TROPONINI <0.010 03/22/2024    BNP <10.0 03/22/2024       Radiology:  Imaging Results              X-Ray Chest AP Portable (Final  result)  Result time 03/22/24 22:29:58      Final result by Sandor Awad MD (03/22/24 22:29:58)                   Impression:      NO ACUTE CARDIOPULMONARY PROCESS IDENTIFIED.      Electronically signed by: Sandor Awad  Date:    03/22/2024  Time:    22:29               Narrative:    EXAMINATION:  XR CHEST AP PORTABLE    CLINICAL HISTORY:  Sepsis;    TECHNIQUE:  One    COMPARISON:  December 12, 2021.    FINDINGS:  Cardiopericardial silhouette is within normal limits.  No acute dense focal or segmental consolidation, congestive process, pleural effusions or pneumothorax.                                      Current Medications:     Infusions:   lactated ringers 100 mL/hr at 03/23/24 0153        Scheduled:   apixaban  5 mg Oral BID    atorvastatin  20 mg Oral QHS    cefTRIAXone (Rocephin) IV (PEDS and ADULTS)  1 g Intravenous Q24H    lactated ringers  500 mL Intravenous Once    levothyroxine  112 mcg Oral Before breakfast    QUEtiapine  25 mg Oral BID    traZODone  50 mg Oral QHS        PRN:  acetaminophen, dextrose 10%, dextrose 10%, glucagon (human recombinant), glucose, glucose, hydrALAZINE, insulin aspart U-100, labetalol, melatonin, naloxone, sodium chloride 0.9%      Assessment & Plan:     UTI   2/4 SIRS criteria (tachycardia and tachypnea), resolved  AMS  - WBC wnl. Lactic acid improved with IVF.   - UA with few bacteria, 6-10 WBC, proteinuria, ketonuria, negative leuk esterase and nitrites. Urine Cx pending.   - Blood cultures x2 pending   - Continue IV Rocephin 1g q24h while awaiting cultures   - Decreased UOP with 145mL residual volume on bladder scan. Likely dehydration. Will give LR 500mL bolus.  - Continue maintenance LR @ 100mL/hr.  - Strict Is/Os     Vascular Dementia  Malnutrition   - Continue home Seroquel 25mg bid  - Continue trazodone 50mg nightly  - Delirium and fall precautions  - Bedside swallow passed prior to soft, bite-sized diet.  - Nutrition consulted   - PT/OT for evaluation and  treatment      DM-2  - Low SSI  - glucose monitoring with meals      HTN  - does not require home antihypertensives   - prn labetalol and hydralazine      Hypothyroidism   - TSH wnl  - continue home synthroid 112mcg qd     CAD  Hx of Afib  - Continue home Eliquis and atorvastatin     Access: pIV  Antibiotics: Rocephin   Diet: Soft, bite-sized  DVT Prophylaxis: Eliquis   Fluids: LR @ 100mL/hr      Disposition: Continue IV antibiotics and inpatient monitoring while awaiting cultures. Case management for DC plan. Likely DC tomorrow.       Erika Rene DO  [facility] HO-1

## 2024-03-23 NOTE — PLAN OF CARE
Problem: Adult Inpatient Plan of Care  Goal: Absence of Hospital-Acquired Illness or Injury  Outcome: Ongoing, Progressing  Goal: Optimal Comfort and Wellbeing  Outcome: Ongoing, Progressing  Goal: Readiness for Transition of Care  Outcome: Ongoing, Progressing     Problem: Impaired Wound Healing  Goal: Optimal Wound Healing  Outcome: Ongoing, Progressing     Problem: Skin Injury Risk Increased  Goal: Skin Health and Integrity  Outcome: Ongoing, Progressing     Problem: Fall Injury Risk  Goal: Absence of Fall and Fall-Related Injury  Outcome: Ongoing, Progressing

## 2024-03-23 NOTE — ED PROVIDER NOTES
Encounter Date: 3/22/2024       History     Chief Complaint   Patient presents with    Altered Mental Status     Pt. In via [facility] for AMS. Hx of dementia. Family states pt. Has been more lethargic lately and has not been taking in fluids or eating.     Brought by the family due to possible dehydration. Family states she is not eating or drinking. No fever, vomiting or diarrhea. Hx of dementia.    The history is provided by the patient, a relative and the EMS personnel.     Review of patient's allergies indicates:   Allergen Reactions    Aspirin      Other reaction(s): stomach pain    Iodinated contrast media     Tramadol-acetaminophen      Past Medical History:   Diagnosis Date    Dementia     Diabetes mellitus, type 2     Hypertension      Past Surgical History:   Procedure Laterality Date    Cardiac loop recorder  12/13/2021    HYSTERECTOMY      INTRIOR VENA CAVA FILTER      THYROIDECTOMY  04/02/2015     Family History   Problem Relation Age of Onset    Hypertension Mother     Thyroid disease Mother     Stomach cancer Brother     Thyroid disease Brother      Social History     Tobacco Use    Smoking status: Former     Passive exposure: Never    Smokeless tobacco: Never   Substance Use Topics    Alcohol use: Not Currently    Drug use: Never     Review of Systems   Unable to perform ROS: Dementia       Physical Exam     Initial Vitals [03/22/24 2123]   BP Pulse Resp Temp SpO2   124/78 (!) 113 20 98.3 °F (36.8 °C) 98 %      MAP       --         Physical Exam    Nursing note and vitals reviewed.  Constitutional: She appears well-developed.   Underweight, chronically ill   HENT:   Head: Normocephalic and atraumatic.   Nose: Nose normal.   Mouth/Throat: No oropharyngeal exudate.   Dry oral mucosa   Eyes: Conjunctivae and EOM are normal. Pupils are equal, round, and reactive to light.   Neck: Neck supple. No thyromegaly present. No JVD present.   Normal range of motion.  Cardiovascular:  Regular rhythm, normal heart  sounds and intact distal pulses.           tachycardic   Pulmonary/Chest: Breath sounds normal.   Abdominal: Abdomen is soft. Bowel sounds are normal. She exhibits no distension. There is no abdominal tenderness. There is no rebound and no guarding.   Genitourinary:    Genitourinary Comments: Wet diaper in place     Musculoskeletal:         General: No edema. Normal range of motion.      Cervical back: Normal range of motion and neck supple.     Neurological: She is alert. She has normal strength.   Confused but responsive   Skin: Skin is warm and dry. No rash noted. No erythema. No pallor.   Psychiatric:   Aggressive         ED Course   Procedures  Labs Reviewed   COMPREHENSIVE METABOLIC PANEL - Abnormal; Notable for the following components:       Result Value    Chloride 108 (*)     Carbon Dioxide 21 (*)     Glucose Level 116 (*)     Blood Urea Nitrogen 20.2 (*)     Calcium Level Total 10.6 (*)     Protein Total 8.5 (*)     Globulin 4.3 (*)     Albumin/Globulin Ratio 1.0 (*)     All other components within normal limits   LACTIC ACID, PLASMA - Abnormal; Notable for the following components:    Lactic Acid Level 2.5 (*)     All other components within normal limits   URINALYSIS, REFLEX TO URINE CULTURE - Abnormal; Notable for the following components:    Appearance, UA Turbid (*)     Specific Gravity, UA 1.034 (*)     Protein, UA 2+ (*)     Ketones, UA 4+ (*)     Urobilinogen, UA 1+ (*)     WBC, UA 6-10 (*)     Bacteria, UA Few (*)     Squamous Epithelial Cells, UA Few (*)     Mucous, UA Many (*)     All other components within normal limits   CBC WITH DIFFERENTIAL - Abnormal; Notable for the following components:    MCH 24.9 (*)     MCHC 29.7 (*)     MPV 10.5 (*)     All other components within normal limits   B-TYPE NATRIURETIC PEPTIDE - Normal   TROPONIN I - Normal   MAGNESIUM - Normal   PHOSPHORUS - Normal   BLOOD CULTURE OLG   BLOOD CULTURE OLG   CBC W/ AUTO DIFFERENTIAL    Narrative:     The following orders  were created for panel order CBC auto differential.  Procedure                               Abnormality         Status                     ---------                               -----------         ------                     CBC with Differential[[id number]]       Abnormal            Final result                 Please view results for these tests on the individual orders.   COVID/FLU A&B PCR   LACTIC ACID, PLASMA     EKG Readings: (Independently Interpreted)   Initial Reading: No STEMI. Rhythm: Sinus Tachycardia. Heart Rate: 118. Conduction: Normal. ST Segments: Normal ST Segments. T Waves: Normal. Axis: Normal. Clinical Impression: Sinus Tachycardia       Imaging Results              X-Ray Chest AP Portable (In process)  Result time 03/22/24 22:29:59                  X-Rays:   Independently Interpreted Readings:   Chest X-Ray: Normal heart size.  No infiltrates.  No acute abnormalities.     Medications   lactated ringers bolus 500 mL (500 mLs Intravenous New Bag 3/22/24 0661)   cefTRIAXone (Rocephin) 1 g in dextrose 5 % in water (D5W) 100 mL IVPB (MB+) (has no administration in time range)     Medical Decision Making  Amount and/or Complexity of Data Reviewed  Independent Historian: guardian     Details: Not eating or drinking for few days  Labs: ordered. Decision-making details documented in ED Course.  Radiology: ordered and independent interpretation performed. Decision-making details documented in ED Course.  ECG/medicine tests: ordered and independent interpretation performed. Decision-making details documented in ED Course.  Discussion of management or test interpretation with external provider(s): 10:29 PM Consult: I discussed the case with Dr. Connors ([facility]). Agrees with current management.   Recommends will evaluate in ED        Additional MDM:   Differential Diagnosis:   Drug overdose, hypoglycemia, stroke, encephalopathy, medication side effects among others               Sepsis Perfusion  "Assessment: "I attest a sepsis perfusion exam was performed within 6 hours of sepsis, severe sepsis, or septic shock presentation, following fluid resuscitation."              10:28 PM    Pt with 2/4 SIRS and suspected UTI. Lactic 2.5; Rocephin started. Will admit        Clinical Impression:  Final diagnoses:  [R41.82] AMS (altered mental status)          ED Disposition Condition    Observation Augusto Rothman MD  03/22/24 1064    "

## 2024-03-23 NOTE — PLAN OF CARE
[facility]      Resident Team: [facility] List   Attending Physician: Radha Del Rosario*  Resident: Makenzie Hernandez  Intern: [facility] Length of Stay: 0 days    Subjective   I personally examined the patient at bedside with the intern, Dr Yarbrough, and Third Year, Dr Hernandez. I agree with the intern's HPI, physical exam, assessment, and plan. The assessment and plan were discussed with the attending on call for the day, Dr Marques.     Brief HPI:  Pt is an 80 yo F with a PMH of HTN, HLD, dementia likely vascular with behavioral disturbances, Afib, hypothyroidism, and osteoporosis. She presented to the ED via EMS for concerns of AMS. Family members stated pt had become more confused and agitated at home than she was previously. Poor oral intake over last few days. Family members were not at bedside during our evaluation of pt; unable to gather any further information at this time.  Upon arrival to ED she was tachycardic, AF, normotensive and saturating well on room air. Lab work showed no leukocytosis, elevated BUN at 20.2, elevated Lactic acid, BNP and trop wnl. Urine collected from catheter with concerns for possible UTI; send for culture. Blood cultures obtained. Pt given Rocephin and started on fluids. Family medicine team consulted for admission.     Objective     Vital Signs (Most Recent):  Temp: 97.5 °F (36.4 °C) (03/23/24 0002)  Pulse: 77 (03/23/24 0015)  Resp: 17 (03/23/24 0015)  BP: 129/89 (03/23/24 0002)  SpO2: 100 % (03/23/24 0015) Vital Signs (24h Range):  Temp:  [97.5 °F (36.4 °C)-98.3 °F (36.8 °C)] 97.5 °F (36.4 °C)  Pulse:  [] 77  Resp:  [14-25] 17  SpO2:  [98 %-100 %] 100 %  BP: (110-138)/() 129/89     Physical Examination:  Physical Exam  Vitals and nursing note reviewed.   Constitutional:       General: She is not in acute distress.     Appearance: She is not toxic-appearing.      Comments: Appears cachetic    Cardiovascular:      Rate and  Rhythm: Normal rate and regular rhythm.      Heart sounds: No murmur heard.  Pulmonary:      Effort: Pulmonary effort is normal. No respiratory distress.      Breath sounds: No wheezing.   Abdominal:      General: Bowel sounds are normal.      Palpations: Abdomen is soft. There is no mass.      Tenderness: There is no abdominal tenderness. There is no guarding.      Hernia: No hernia is present.   Musculoskeletal:      Right lower leg: No edema.      Left lower leg: No edema.   Neurological:      Mental Status: She is alert.      Comments: Pt talking and calm on exam. She does not know where she is or why she is currently in the hospital.        Assessment and Plan    2/4 SIRS criteria 2/2 UTI   Vascular Dementia  H/O Afib  HTN  HLD  Hypothyroidism  Osteoporosis    -Will admit pt and continue with daily Rocephin 1g and fluids. Awaiting urine culture results.   -Blood cultures pending; no leukocytosis, AF, lactic acid improved with fluids; low suspicion for sepsis at this time.   -Will continue with home Seroquel 25mg BID at this time (was discussion to increase to 50mg in the evening if needed). Delirium and fall precautions ordered.   -Will complete bed side swallow study. If passes can have soft pureed diet. Boost ordered. Nutrition consulted.   -Wound care consulted for sacral wound. Reposition every 2 hours while awake.  -Continue home medications.   -Monitor blood sugars; Low dose sliding scale  -PT/OT consulted.   -Case Management consulted for discharge planning      Cristobal Banegas MD  [facility] -II

## 2024-03-23 NOTE — CONSULTS
Consult for DC planning noted. Left VM's for daughters, Shakira Kendra, P: [phone], and Emerson Cash, P: [phone], requesting return call. No answer on room phone at this time.

## 2024-03-23 NOTE — PT/OT/SLP PROGRESS
Physical Therapy    Missed Treatment Session    Patient Name:  Esthela Marrufo   MRN:  [id number]      Patient not seen at this time. Patient daughter attempted to help PT convince the patient to participate in EVAL. However, patient was uncooperative and started to get very agitated when PT attempted to take vitals and check objective measures. Will try again as schedule allows. However, patient may not be appropriate for PT at this time due to severity of cognitive deficits.    Will follow-up as patient is appropriate/available/agreeable to participate and as therapists' schedule allows.

## 2024-03-23 NOTE — H&P
[facility] Medicine  History & Physical      Patient Name: Esthela Marrufo  : 1945  MRN: [id number]  Admission Date: 3/22/2024   Length of Stay: 0  Admitting Service: Hospital Medicine  Attending Physician: Manan Marques MD  PCP: Fallon Granados MD  Code status: Full Code    Chief Complaint   Altered Mental Status (Pt. In via [facility] for AMS. Hx of dementia. Family states pt. Has been more lethargic lately and has not been taking in fluids or eating.)    History of Present Illness   79 y.o. female with a past medical history of DM-2, Dementia, HTN presenting to ED via EMS for AMS. Pt was brought in with family, who is not present during exam. Per ED physician, family reported decreased oral intake, increased AMS, and combativeness at home. No fever, vomiting, diarrhea. Per chart review, pt's daughter contacted PCP on 3/12/24 for concerns of decreased oral intake and generalized weakness.     In ED, patient arrived tachycardic and tachypneic, afebrile, O2 100%.  Labs revealed lactic acid of 2.5, WBC wnl. UA with few bacteria, WBC, 4+ ketones, 2+ protein, negative leuk esterase and nitrites. CXR obtained, no effusions or consolidations noted. IVF and Rocephin initiated. FM consulted for evaluation.     ROS   Pertinent positive and negative as mentioned in HPI   Review of Systems   Unable to perform ROS: Dementia     Past Medical History     Past Medical History:   Diagnosis Date    Dementia     Diabetes mellitus, type 2     Hypertension      Past Surgical History     Past Surgical History:   Procedure Laterality Date    Cardiac loop recorder  2021    HYSTERECTOMY      INTRIOR VENA CAVA FILTER      THYROIDECTOMY  2015     Social History     Social History     Tobacco Use    Smoking status: Former     Passive exposure: Never    Smokeless tobacco: Never   Substance Use Topics    Alcohol use: Not Currently      Family History   Reviewed and noncontributory    Allergies    Aspirin, Iodinated contrast media, and Tramadol-acetaminophen  Home Medications     Prior to Admission medications    Medication Sig Start Date End Date Taking? Authorizing Provider   alendronate (FOSAMAX) 70 MG tablet Take 1 tablet (70 mg total) by mouth every 7 days. 1/10/24   Naomi Marques MD   apixaban (ELIQUIS) 5 mg Tab Take 1 tablet (5 mg total) by mouth 2 (two) times daily. 1/10/24 7/8/24  Naomi Marques MD   atorvastatin (LIPITOR) 20 MG tablet Take 1 tablet (20 mg total) by mouth every evening. 1/10/24 7/8/24  Naomi Marques MD   blood sugar diagnostic Strp   glucometer test strips and lancets, See Instructions, use to monitor glucose once a day R73.03, # 50 EA, 6 Refill(s), Pharmacy: [id number], 165, cm, Height/Length Dosing, 10/19/21 8:59:00 CDT, 48.4, kg, Weight Dosing, 10/19/21 8:59:00 CDT 10/19/21   Provider, Historical   blood-glucose meter kit   Glucometer, See Instructions, use to monitor glucose once a day R73.03, # 1 EA, 0 Refill(s), Pharmacy: [id number], 165, cm, Height/Length Dosing, 10/19/21 8:59:00 CDT, 48.4, kg, Weight Dosing, 10/19/21 8:59:00 CDT 10/19/21   Provider, Historical   diclofenac sodium (VOLTAREN) 1 % Gel Apply 4 g topically 4 (four) times daily as needed (knee pain). 1/10/24   Naomi Marques MD   levothyroxine (SYNTHROID) 112 MCG tablet Take 1 tablet (112 mcg total) by mouth once daily. 8/28/23   Fallon Granados MD   QUEtiapine (SEROQUEL) 25 MG Tab Take 1 tablet (25 mg total) by mouth once daily AND 2 tablets (50 mg total) every evening. 2/1/24   Fallon Granados MD   traZODone (DESYREL) 50 MG tablet Take 1 tablet (50 mg total) by mouth every evening. 2/9/24   Fallon Granados MD   TRUEPLUS LANCETS 28 gauge Misc USE TO MONITOR GLUCOSE ONCE DAILY 1/6/23   Dorcas Teague, FNP        Physical Exam   Vital Signs  Temp:  [97.5 °F (36.4 °C)-98.3 °F (36.8 °C)]   Pulse:  []   Resp:  [13-25]   BP: (110-148)/()    SpO2:  [98 %-100 %]       Physical Exam  Vitals reviewed.   Constitutional:       General: She is not in acute distress.     Comments: Cachectic, chronically ill-appearing    HENT:      Mouth/Throat:      Mouth: Mucous membranes are dry.   Eyes:      Conjunctiva/sclera: Conjunctivae normal.   Cardiovascular:      Rate and Rhythm: Normal rate and regular rhythm.   Pulmonary:      Effort: Pulmonary effort is normal.      Breath sounds: No wheezing, rhonchi or rales.   Abdominal:      Palpations: Abdomen is soft.      Tenderness: There is no abdominal tenderness.   Musculoskeletal:      Right lower leg: No edema.      Left lower leg: No edema.   Skin:     General: Skin is warm and dry.      Comments: Sacral wounds, refer to media photos   Neurological:      Mental Status: She is alert. She is disoriented.      Comments: Demented as baseline           Labs   I have reviewed the following results below:  CBC  Recent Labs     03/22/24 2144   WBC 7.38   RBC 5.11   HGB 12.7   HCT 42.8   MCV 83.8   MCH 24.9*   MCHC 29.7*   RDW 16.7        Cardiac  Recent Labs     03/22/24 2144   BNP <10.0   TROPONINI <0.010     ABG/Lactate  Recent Labs     03/22/24 2144 03/22/24  2252   LACTIC 2.5* 2.2      Urinalysis  Recent Labs     03/22/24 2153   COLORUA Yellow   APPEARANCEUA Turbid*   SGUA 1.034*   PHUA 6.0   PROTEINUA 2+*   GLUCOSEUA Normal   KETONESUA 4+*   BLOODUA Negative   UROBILINOGEN 1+*   NITRITESUA Negative   LEUKOCYTESUR Negative      BMP  Recent Labs     03/22/24 2144      K 4.3   CHLORIDE 108*   CO2 21*   BUN 20.2*   CREATININE 0.75   GLUCOSE 116*   CALCIUM 10.6*   MG 2.10   PHOS 4.0     LFTs  Recent Labs     03/22/24 2144   ALBUMIN 4.2   GLOBULIN 4.3*   ALKPHOS 66   ALT 7   AST 15   BILITOT 1.0     Diabetes  Recent Labs     03/22/24  2144   GLUCOSE 116*        Microbiology Results (last 7 days)       Procedure Component Value Units Date/Time    Blood culture x two cultures. Draw prior to antibiotics.  [[id number]] Collected: 03/22/24 2252    Order Status: Sent Specimen: Blood Updated: 03/22/24 2301    Blood culture x two cultures. Draw prior to antibiotics. [[id number]] Collected: 03/22/24 2140    Order Status: Sent Specimen: Blood from IV Site Updated: 03/22/24 2148           Imaging     X-Ray Chest AP Portable   Final Result      NO ACUTE CARDIOPULMONARY PROCESS IDENTIFIED.         Electronically signed by: Sandor Awad   Date:    03/22/2024   Time:    22:29        Assessment & Plan     2/4 SIRS criteria   UTI  AMS  - Tachycardia and tachypnea on arrival  - WBC wnl. Lactic acid from 2.5 to 2.2 after 1L IVF.  - Blood cultures x2 obtained   - Will continue IV Rocephin 1g q24h initiated in ED  - LR @ 100mL/hr   - F/u AM labs    Dementia, declining   Malnutrition   - Continue home Seroquel 25mg bid  - Continue trazodone 50mg nightly  - Delirium and fall precautions  - Will place on soft diet and obtain swallow eval in am  - Consult dietician   - OT for evaluation     DM-2  - SSI  - CBG with meals     HTN  - prn labetalol and hydralazine     Hypothyroidism   - continue home synthroid 112mcg qd  - recheck TSH on am labs    CAD  - Continue home Eliquis and atorvastatin     Access: pIV  Antibiotics: Rocephin   Diet: Soft Diabetic   DVT Prophylaxis: Eliquis   Fluids: LR @ 100mL/hr      Erika Rene DO  [employer] [facility]

## 2024-03-24 PROBLEM — N39.0 UTI (URINARY TRACT INFECTION): Status: ACTIVE | Noted: 2024-03-24

## 2024-03-24 NOTE — NURSING
Patient's daughter given discharge instructions voiced understanding. Patient discharged to home. [facility] to take patient home via stretcher. Pt in stable condition

## 2024-03-24 NOTE — PT/OT/SLP EVAL
03/24/2024Physical Therapy Evaluation & Discharge Note    Patient Name:  Esthela Marrufo   MRN:  [id number]    Recommendations     Therapy Intensity Recommendations at Discharge: Moderate Intensity Therapy  Discharge Equipment Recommendations: none   Equipment to be obtained for discharge: none.  Barriers to discharge: severity of deficits, decreased endurance, impaired cognitive status, decreased safety awareness, insight into deficits, medical diagnosis, past medical history, and complicated medical history    Assessment     Esthela Marrufo is a 79 y.o. female admitted with a medical diagnosis of UTI (urinary tract infection).    She presents with the following impairments/functional limitations:  weakness, impaired endurance, impaired sensation, impaired functional mobility, gait instability, impaired balance, impaired cognition, decreased safety awareness.    Hospital discharge anticipated today.    Recent Surgery: * No surgery found *      Plan     Patient discharged from acute PT Services on 03/24/24.    Based on current functional levels observed, patient does not require further acute PT services.    Re-consult PT Services if patient's status changes and therapy services warranted.    Subjective     Communicated with patient's nurse  prior to session.    Patient agreeable to participate in evaluation.     Chief Complaint: Patient daughter reports patients cognition started to decline about 1 month ago.  Patient was walking about 1 month ago but has been wc bound since then  Patient/Family Comments/goals: to dc home  Pain/Comfort:  Pain Rating 1: 0/10    Patients cultural, spiritual, Restorationism conflicts given the current situation: no    Social History  Living Environment: Patient lives with their daughter in a single level home, with no steps, with tub-shower combo.  Functional Level: Prior to admission patient required assistance with ADLs including mobility (bed-transfer-ambulation), eating, dressing,  showering, and toileting and was wheelchair bound.  Equipment Used at Home: walker, rolling, bedside commode, wheelchair, shower chair  Equipment owned (not currently used): rolling walker.  Assistance Upon Discharge: family.    Objective     Patient found supine in bed with peripheral IV, PureWick  upon PT entry to room.    General Precautions: Standard, fall   Orthopedic Precautions:    Braces:    Respiratory Status: room air    Vitals   Unable to determine due to pateint unable to follow directions to keep arm still    Exams:  Orientation: Patient is not oriented to person, place, time, situation  Commands: Patient does not follow commands  RLE ROM: WFL  RLE Strength: Deficits: 2/5  LLE ROM: WFL  LLE Strength: Deficits: 2/5    Functional Mobility:    Bed Mobility:  Rolling Left: maximal assistance  Rolling Right: maximal assistance    Transfers:  Patient refused transfers    Gait:  Patient unable to amb    Other Mobility:  not assessed    Balance:  Sit  Static: 0: Needs MAXIMAL assist to maintain sitting without back support  Dynamic: 0: N/A  Stand  Static: 0: Needs MAXIMAL assist to maintain   Dynamic: 0: N/A    Additional Treatment Session  n/a    Patient left supine in bed with all lines intact, call button in reach, tray table at bedside, patient' nurse notified, and daugther present.    Education     White board updated regarding patient's safest level of mobility with staff assistance.    Goals - No STG's or LTG's established secondary to patient was seen for evaluation and discharge     Multidisciplinary Problems       Physical Therapy Goals       Not on file                    History     Past Medical History:   Diagnosis Date    Dementia     Diabetes mellitus, type 2     Hypertension      Past Surgical History:   Procedure Laterality Date    Cardiac loop recorder  12/13/2021    HYSTERECTOMY      INTRIOR VENA CAVA FILTER      THYROIDECTOMY  04/02/2015     Time Tracking     PT Received On: 03/24/24  PT  Start Time: 0900     PT Stop Time: 0919  PT Total Time (min): 19 min     Billable Minutes: Evaluation 19 03/24/2024

## 2024-03-24 NOTE — CONSULTS
Inpatient Nutrition Assessment    Admit Date: 3/22/2024   Total duration of encounter: 2 days   Patient Age: 79 y.o.    Nutrition Recommendation/Prescription     Continue Soft & Bite Sized diet as tolerated  Will adjust ONS to Boost Glucose Control  TID (provides 190 kcal, 16 g protein per serving)  Will send Salas BID to promote wound healing (provides 90 kcal, 2.5 g protein per serving)    If po intake remains poor, consider PPN through PIV; Clinimix 4.25/5 @ 65mL/hr, 20% lipids daily; to provide 1030kcals, 66pro, 1810mL fluid    Replenish electrolytes as needed  Consider daily MVI  Monitor po intake, wt, labs    Communication of Recommendations: reviewed with nurse    Nutrition Assessment     Malnutrition Assessment/Nutrition-Focused Physical Exam          Energy Intake (Malnutrition):  (Unable to determine) (03/24/24 0743)  Weight Loss (Malnutrition):  (Unable to determine) (03/24/24 0743)  Subcutaneous Fat (Malnutrition):  (Unable to determine) (03/24/24 0743)           Muscle Mass (Malnutrition):  (Unable to determine) (03/24/24 0743)                                   A minimum of two characteristics is recommended for diagnosis of either severe or non-severe malnutrition.    Chart Review    Reason Seen: continuous nutrition monitoring and physician consult for malnutrition    Malnutrition Screening Tool Results   Have you recently lost weight without trying?: No  Have you been eating poorly because of a decreased appetite?: Yes   MST Score: 1     Diagnosis: UTI   2/4 SIRS criteria (tachycardia and tachypnea), resolved  AMS  Vascular Dementia  Malnutrition  DM-2   HTN  Hypothyroidism   CAD  Hx of Afib    Relevant Medical History: DM-2, Dementia, HTN     Scheduled Medications:  apixaban, 5 mg, BID  atorvastatin, 20 mg, QHS  cefTRIAXone (Rocephin) IV (PEDS and ADULTS), 1 g, Q24H  levothyroxine, 112 mcg, Before breakfast  magnesium sulfate IVPB, 1 g, Once  potassium, sodium phosphates, 1 packet, Once  QUEtiapine,  25 mg, BID  traZODone, 50 mg, QHS    Continuous Infusions:  lactated ringers, Last Rate: 100 mL/hr at 03/24/24 0558    PRN Medications: acetaminophen, dextrose 10%, dextrose 10%, glucagon (human recombinant), glucose, glucose, hydrALAZINE, insulin aspart U-100, labetalol, melatonin, naloxone, sodium chloride 0.9%    Calorie Containing IV Medications: no significant kcals from medications at this time    Recent Labs   Lab 03/22/24  2144 03/23/24  0233 03/24/24  0330    144 142   K 4.3 3.5 3.4*   CALCIUM 10.6* 9.6 8.9   PHOS 4.0 2.6 2.5   MG 2.10 1.80 1.70   CHLORIDE 108* 108* 105   CO2 21* 26 28   BUN 20.2* 17.7 7.7*   CREATININE 0.75 0.61 0.46*   EGFRNORACEVR >60 >60 >60   GLUCOSE 116* 113 80*   BILITOT 1.0 0.7 0.7   ALKPHOS 66 57 51   ALT 7 7 7   AST 15 14 15   ALBUMIN 4.2 3.6 3.2*   WBC 7.38 7.50 6.01   HGB 12.7 10.7* 9.8*   HCT 42.8 35.5* 32.5*     Nutrition Orders:  Diet Soft & Bite Sized (IDDSI Level 6)  Dietary nutrition supplements Boost Max Vanilla; BID    Appetite/Oral Intake: poor/0-25% of meals  Factors Affecting Nutritional Intake: impaired cognitive status/motor control and decreased appetite  Food/Christian/Cultural Preferences: unable to obtain  Food Allergies: no known food allergies  Last Bowel Movement: 03/22/24  Wound(s):     Altered Skin Integrity 03/22/24 2224 Sacral spine #1 Ulceration Partial thickness tissue loss. Shallow open ulcer with a red or pink wound bed, without slough. Intact or Open/Ruptured Serum-filled blister.-Tissue loss description: Partial thickness     Comments  3/24: Unable to visit pt; spoke with nsg. Nsg reports pt with poor po intake, consuming <25% of meals. Will adjust ONS to Boost Glucose Control BID and will add Salas BID for sacral wound. Pt noted with decreased appetite pta. No wt loss noted per EMR wt hx. Pt may benefit from PPN; recs provided for extra nutrition until po intake improves. K (L) -- replete as needed.      Anthropometrics    Height: 5' (152.4  cm), Height Method: Stated  Last Weight: 52.2 kg (115 lb) (03/23/24 0208), Weight Method: Standard Scale  BMI (Calculated): 22.5  BMI Classification: normal (BMI 18.5-24.9)     Ideal Body Weight (IBW), Female: 100 lb     % Ideal Body Weight, Female (lb): 115 %                    Usual Body Weight (UBW), kg:  (Unable to obtain)        Usual Weight Provided By: unable to obtain usual weight    Wt Readings from Last 5 Encounters:   03/23/24 52.2 kg (115 lb)   01/27/24 45.4 kg (100 lb)   01/10/24 49.9 kg (110 lb)   05/31/23 48.1 kg (106 lb)   01/09/23 49.2 kg (108 lb 7.5 oz)     Weight Change(s) Since Admission:   Wt Readings from Last 1 Encounters:   03/23/24 0208 52.2 kg (115 lb)   03/22/24 2123 52.2 kg (115 lb)   Admit Weight: 52.2 kg (115 lb) (03/22/24 2123), Weight Method: Stated    Estimated Needs    Weight Used For Calorie Calculations: 52.2 kg (115 lb 1.3 oz)  Energy Calorie Requirements (kcal): 1874-2951 kcals (32-35 kcal/kg)     Weight Used For Protein Calculations: 52.2 kg (115 lb 1.3 oz)  Protein Requirements: 63g (1.2 g/kg)  Fluid Requirements (mL): 1670-1827mL or per MD (1mL/kcal)    Enteral Nutrition     Patient not receiving enteral nutrition at this time.    Parenteral Nutrition     Patient not receiving parenteral nutrition support at this time.    Evaluation of Received Nutrient Intake    Calories: not meeting estimated needs  Protein: not meeting estimated needs    Patient Education     Not applicable.    Nutrition Diagnosis     PES: Inadequate energy intake related to inability to consume sufficient nutrients as evidenced by po intake <25%. (new)     Nutrition Interventions     Intervention(s): modified composition of meals/snacks, modified composition of parenteral nutrition, modified rate of parenteral nutrition, commercial beverage, multivitamin/mineral supplement therapy, and collaboration with other providers    Goal: Meet greater than 80% of nutritional needs by follow-up. (new)  Goal:  Maintain weight throughout hospitalization. (new)    Nutrition Goals & Monitoring     Dietitian will monitor: energy intake, weight, electrolyte/renal panel, and glucose/endocrine profile    Nutrition Risk/Follow-Up: high (follow-up in 1-4 days)   Please consult if re-assessment needed sooner.

## 2024-03-24 NOTE — DISCHARGE SUMMARY
[facility] Discharge Summary    Admitting Physician: Manan Marques MD  Attending Physician: Manan Marques MD  Date of Admit: 3/22/2024  Date of Discharge: 3/24/2024    Condition: Stable  Outcome: Patient tolerated treatment/procedure well without complication and is now ready for discharge.  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       Patient Active Problem List   Diagnosis    Hypertension    Hyperlipidemia    Senile dementia of Alzheimer's type    UTI (urinary tract infection)       Consultants and Procedures     Consultants:  IP CONSULT TO HOSPITAL MEDICINE  IP CONSULT TO SOCIAL WORK/CASE MANAGEMENT  IP CONSULT TO REGISTERED DIETITIAN/NUTRITIONIST  WOUND CARE CONSULT    Procedures:   * No surgery found *     Brief Admission History      79 y.o. female with a past medical history of DM-2, Dementia, HTN presenting to ED via EMS for AMS. Pt was brought in with family, who is not present during exam. Per ED physician, family reported decreased oral intake, increased AMS, and combativeness at home. No fever, vomiting, diarrhea. Per chart review, pt's daughter contacted PCP on 3/12/24 for concerns of decreased oral intake and generalized weakness.      In ED, patient arrived tachycardic, afebrile, tachypneic, O2 100% on room air.  Labs revealed lactic acid of 2.5, WBC wnl. UA with few bacteria, WBC, 4+ ketones, 2+ protein, negative leuk esterase and nitrites. CXR obtained, no effusions or consolidations noted. IVF and Rocephin initiated. FM consulted for evaluation.    Hospital Course with Pertinent Findings     Patient was admitted for UTI with AMS and signs of dehydration. IV antibiotics and IVF were continued with improvement  of tachycardia and lactic acidosis. Patient has remained afebrile with WBC wnl throughout admission. Her mental status and PO intake have improved to baseline. She is now stable for discharge. Daughter agreeable to hospice services, which has been set up prior to discharge.     Discharge physical exam:  Vitals  BP: (!) 145/70  Temp: 97 °F (36.1 °C)  Temp Source: Axillary  Pulse: (!) 56  Resp: 18  SpO2: 100 %  Height: 5' (152.4 cm)  Weight: 52.2 kg (115 lb)    Physical Exam  Vitals reviewed.   Constitutional:       Appearance: She is not toxic-appearing.   Cardiovascular:      Rate and Rhythm: Normal rate and regular rhythm.   Pulmonary:      Effort: Pulmonary effort is normal.      Breath sounds: No wheezing, rhonchi or rales.   Abdominal:      Palpations: Abdomen is soft.   Musculoskeletal:      Right lower leg: No edema.      Left lower leg: No edema.   Skin:     General: Skin is warm.   Neurological:      Mental Status: She is alert.      Comments: Demented at baseline         TIME SPENT ON DISCHARGE: 60 minutes    Discharge Medications        Medication List        START taking these medications      cephALEXin 500 MG capsule  Commonly known as: KEFLEX  Take 1 capsule (500 mg total) by mouth every 12 (twelve) hours. for 5 days  Start taking on: March 25, 2024            CONTINUE taking these medications      alendronate 70 MG tablet  Commonly known as: FOSAMAX  Take 1 tablet (70 mg total) by mouth every 7 days.     apixaban 5 mg Tab  Commonly known as: ELIQUIS  Take 1 tablet (5 mg total) by mouth 2 (two) times daily.     atorvastatin 20 MG tablet  Commonly known as: LIPITOR  Take 1 tablet (20 mg total) by mouth every evening.     blood sugar diagnostic Strp     blood-glucose meter kit     diclofenac sodium 1 % Gel  Commonly known as: VOLTAREN  Apply 4 g topically 4 (four) times daily as needed (knee pain).     levothyroxine 112 MCG tablet  Commonly known as: SYNTHROID  Take 1 tablet (112 mcg total) by mouth once  daily.     QUEtiapine 25 MG Tab  Commonly known as: SEROQUEL  Take 1 tablet (25 mg total) by mouth once daily AND 2 tablets (50 mg total) every evening.     traZODone 50 MG tablet  Commonly known as: DESYREL  Take 1 tablet (50 mg total) by mouth every evening.     TRUEPLUS LANCETS 28 gauge Misc  Generic drug: lancets  USE TO MONITOR GLUCOSE ONCE DAILY               Where to Get Your Medications        These medications were sent to [facility] #[id number] at [address] [facility] AT   [address] [facility] [address]      Phone: [phone]   cephALEXin 500 MG capsule         Discharge Information:       Keflex x 5 days to complete 7 day antibiotics course.   Return precautions discussed.  DC home with daughter under Hospice care.       Erika Rene DO  [facility], [facility]

## 2024-03-25 NOTE — PROGRESS NOTES
POST D/C DOCUMENTATION     03/25/24 0927   Missed Time Reason   Missed Treatment Reason Patient discharged prior to initiation of evaluation

## 2024-05-20 NOTE — PROGRESS NOTES
Established Patient - Audio Only Telehealth Visit     The patient location is: Home  The chief complaint leading to consultation is: Dementia  Visit type: Virtual visit with audio only (telephone)  Total time spent with patient: 15       The reason for the audio only service rather than synchronous audio and video virtual visit was related to technical difficulties or patient preference/necessity.     Each patient to whom I provide medical services by telemedicine is:  (1) informed of the relationship between the physician and patient and the respective role of any other health care provider with respect to management of the patient; and (2) notified that they may decline to receive medical services by telemedicine and may withdraw from such care at any time. Patient verbally consented to receive this service via voice-only telephone call.     Patient daughter spoken to regarding patients dementia.   Patient at baseline, stable on current seroquel.  Eating less, daughter trying to give patient foods she likes to eat.  No Behavioral disturbances or outbursts, sleeping well, no complaints overall.   Continue current management.          Fallon Granados MD  Attending - Family Medicine / Geriatric Medicine  [facility]                 This service was not originating from a related E/M service provided within the previous 7 days nor will  to an E/M service or procedure within the next 24 hours or my soonest available appointment.  Prevailing standard of care was able to be met in this audio-only visit.